# Patient Record
Sex: FEMALE | Race: WHITE | HISPANIC OR LATINO | URBAN - METROPOLITAN AREA
[De-identification: names, ages, dates, MRNs, and addresses within clinical notes are randomized per-mention and may not be internally consistent; named-entity substitution may affect disease eponyms.]

---

## 2023-02-26 LAB — URINE CULTURE: NORMAL

## 2023-03-22 ENCOUNTER — TELEPHONE (OUTPATIENT)
Dept: PRIMARY CARE | Facility: CLINIC | Age: 63
End: 2023-03-22
Payer: COMMERCIAL

## 2023-03-22 DIAGNOSIS — H91.92 HEARING LOSS OF LEFT EAR, UNSPECIFIED HEARING LOSS TYPE: Primary | ICD-10-CM

## 2023-03-22 LAB
ALANINE AMINOTRANSFERASE (SGPT) (U/L) IN SER/PLAS: 20 U/L (ref 7–45)
ALBUMIN (G/DL) IN SER/PLAS: 3.9 G/DL (ref 3.4–5)
ALKALINE PHOSPHATASE (U/L) IN SER/PLAS: 140 U/L (ref 33–136)
ANION GAP IN SER/PLAS: 12 MMOL/L (ref 10–20)
ASPARTATE AMINOTRANSFERASE (SGOT) (U/L) IN SER/PLAS: 18 U/L (ref 9–39)
BASOPHILS (10*3/UL) IN BLOOD BY AUTOMATED COUNT: 0.03 X10E9/L (ref 0–0.1)
BASOPHILS/100 LEUKOCYTES IN BLOOD BY AUTOMATED COUNT: 0.4 % (ref 0–2)
BILIRUBIN TOTAL (MG/DL) IN SER/PLAS: 0.5 MG/DL (ref 0–1.2)
C REACTIVE PROTEIN (MG/L) IN SER/PLAS: 0.14 MG/DL
CALCIUM (MG/DL) IN SER/PLAS: 8.9 MG/DL (ref 8.6–10.3)
CARBON DIOXIDE, TOTAL (MMOL/L) IN SER/PLAS: 28 MMOL/L (ref 21–32)
CHLORIDE (MMOL/L) IN SER/PLAS: 101 MMOL/L (ref 98–107)
CREATININE (MG/DL) IN SER/PLAS: 0.52 MG/DL (ref 0.5–1.05)
EOSINOPHILS (10*3/UL) IN BLOOD BY AUTOMATED COUNT: 0.05 X10E9/L (ref 0–0.7)
EOSINOPHILS/100 LEUKOCYTES IN BLOOD BY AUTOMATED COUNT: 0.7 % (ref 0–6)
ERYTHROCYTE DISTRIBUTION WIDTH (RATIO) BY AUTOMATED COUNT: 11.9 % (ref 11.5–14.5)
ERYTHROCYTE MEAN CORPUSCULAR HEMOGLOBIN CONCENTRATION (G/DL) BY AUTOMATED: 34.2 G/DL (ref 32–36)
ERYTHROCYTE MEAN CORPUSCULAR VOLUME (FL) BY AUTOMATED COUNT: 92 FL (ref 80–100)
ERYTHROCYTES (10*6/UL) IN BLOOD BY AUTOMATED COUNT: 4.45 X10E12/L (ref 4–5.2)
GFR FEMALE: >90 ML/MIN/1.73M2
GLUCOSE (MG/DL) IN SER/PLAS: 318 MG/DL (ref 74–99)
HEMATOCRIT (%) IN BLOOD BY AUTOMATED COUNT: 40.9 % (ref 36–46)
HEMOGLOBIN (G/DL) IN BLOOD: 14 G/DL (ref 12–16)
IMMATURE GRANULOCYTES/100 LEUKOCYTES IN BLOOD BY AUTOMATED COUNT: 0.3 % (ref 0–0.9)
LEUKOCYTES (10*3/UL) IN BLOOD BY AUTOMATED COUNT: 7 X10E9/L (ref 4.4–11.3)
LYMPHOCYTES (10*3/UL) IN BLOOD BY AUTOMATED COUNT: 2.21 X10E9/L (ref 1.2–4.8)
LYMPHOCYTES/100 LEUKOCYTES IN BLOOD BY AUTOMATED COUNT: 31.8 % (ref 13–44)
MONOCYTES (10*3/UL) IN BLOOD BY AUTOMATED COUNT: 0.35 X10E9/L (ref 0.1–1)
MONOCYTES/100 LEUKOCYTES IN BLOOD BY AUTOMATED COUNT: 5 % (ref 2–10)
NEUTROPHILS (10*3/UL) IN BLOOD BY AUTOMATED COUNT: 4.29 X10E9/L (ref 1.2–7.7)
NEUTROPHILS/100 LEUKOCYTES IN BLOOD BY AUTOMATED COUNT: 61.8 % (ref 40–80)
PLATELETS (10*3/UL) IN BLOOD AUTOMATED COUNT: 241 X10E9/L (ref 150–450)
POTASSIUM (MMOL/L) IN SER/PLAS: 3.6 MMOL/L (ref 3.5–5.3)
PROTEIN TOTAL: 6.4 G/DL (ref 6.4–8.2)
SEDIMENTATION RATE, ERYTHROCYTE: 13 MM/H (ref 0–30)
SODIUM (MMOL/L) IN SER/PLAS: 137 MMOL/L (ref 136–145)
UREA NITROGEN (MG/DL) IN SER/PLAS: 15 MG/DL (ref 6–23)

## 2023-03-22 NOTE — TELEPHONE ENCOUNTER
Pt called looking for a referral for an ENT she said she is having a great deal of difficulty hearing on her left side.  Please advise.

## 2023-03-23 LAB
ANTI-NUCLEAR ANTIBODY (ANA): NEGATIVE
COMPLEMENT C3 (MG/DL) IN SER/PLAS: 145 MG/DL (ref 87–200)
COMPLEMENT C4 (MG/DL) IN SER/PLAS: 28 MG/DL (ref 10–50)
RHEUMATOID FACTOR (IU/ML) IN SERUM OR PLASMA: <10 IU/ML (ref 0–15)

## 2023-03-25 LAB — CITRULLINE ANTIBODY, IGG: 2 UNITS (ref 0–19)

## 2023-03-30 ENCOUNTER — TELEPHONE (OUTPATIENT)
Dept: PRIMARY CARE | Facility: CLINIC | Age: 63
End: 2023-03-30
Payer: COMMERCIAL

## 2023-03-30 DIAGNOSIS — N30.00 ACUTE CYSTITIS WITHOUT HEMATURIA: Primary | ICD-10-CM

## 2023-03-30 RX ORDER — CIPROFLOXACIN 500 MG/1
500 TABLET ORAL 2 TIMES DAILY
Qty: 10 TABLET | Refills: 0 | Status: SHIPPED | OUTPATIENT
Start: 2023-03-30 | End: 2023-04-04

## 2023-03-30 NOTE — TELEPHONE ENCOUNTER
Per patient, she has been having UTI like symptoms since Monday, odor, cloudy urine, burning, pressure, frequency. She is asking for an appt. or antibiotics, please advise.   Local pharmacy [facility]

## 2023-04-26 ENCOUNTER — OFFICE VISIT (OUTPATIENT)
Dept: PRIMARY CARE | Facility: CLINIC | Age: 63
End: 2023-04-26
Payer: COMMERCIAL

## 2023-04-26 VITALS
OXYGEN SATURATION: 98 % | DIASTOLIC BLOOD PRESSURE: 80 MMHG | BODY MASS INDEX: 28.63 KG/M2 | RESPIRATION RATE: 16 BRPM | WEIGHT: 142 LBS | HEART RATE: 68 BPM | HEIGHT: 59 IN | TEMPERATURE: 97.8 F | SYSTOLIC BLOOD PRESSURE: 128 MMHG

## 2023-04-26 DIAGNOSIS — Z12.31 ENCOUNTER FOR SCREENING MAMMOGRAM FOR BREAST CANCER: ICD-10-CM

## 2023-04-26 DIAGNOSIS — R53.81 PHYSICAL DEBILITY: ICD-10-CM

## 2023-04-26 DIAGNOSIS — Z00.00 HEALTHCARE MAINTENANCE: Primary | ICD-10-CM

## 2023-04-26 DIAGNOSIS — Z12.31 ENCOUNTER FOR SCREENING MAMMOGRAM FOR MALIGNANT NEOPLASM OF BREAST: ICD-10-CM

## 2023-04-26 LAB
POC APPEARANCE, URINE: CLEAR
POC BILIRUBIN, URINE: NEGATIVE
POC BLOOD, URINE: NEGATIVE
POC COLOR, URINE: YELLOW
POC GLUCOSE, URINE: ABNORMAL MG/DL
POC KETONES, URINE: NEGATIVE MG/DL
POC LEUKOCYTES, URINE: NEGATIVE
POC NITRITE,URINE: NEGATIVE
POC PH, URINE: 6 PH
POC PROTEIN, URINE: NEGATIVE MG/DL
POC SPECIFIC GRAVITY, URINE: 1.01
POC UROBILINOGEN, URINE: 0.2 EU/DL

## 2023-04-26 PROCEDURE — 81002 URINALYSIS NONAUTO W/O SCOPE: CPT | Performed by: FAMILY MEDICINE

## 2023-04-26 PROCEDURE — 99396 PREV VISIT EST AGE 40-64: CPT | Performed by: FAMILY MEDICINE

## 2023-04-26 PROCEDURE — 93000 ELECTROCARDIOGRAM COMPLETE: CPT | Performed by: FAMILY MEDICINE

## 2023-04-26 PROCEDURE — 1036F TOBACCO NON-USER: CPT | Performed by: FAMILY MEDICINE

## 2023-04-26 RX ORDER — METFORMIN HYDROCHLORIDE 1000 MG/1
1 TABLET ORAL 2 TIMES DAILY
COMMUNITY
Start: 2020-07-22 | End: 2024-01-17 | Stop reason: SDUPTHER

## 2023-04-26 RX ORDER — LISINOPRIL 10 MG/1
1 TABLET ORAL DAILY
COMMUNITY
Start: 2019-12-02 | End: 2024-06-04 | Stop reason: SDUPTHER

## 2023-04-26 RX ORDER — MINOCYCLINE HYDROCHLORIDE 100 MG/1
100 CAPSULE ORAL DAILY
COMMUNITY
End: 2024-06-04 | Stop reason: WASHOUT

## 2023-04-26 RX ORDER — INSULIN DEGLUDEC 100 U/ML
INJECTION, SOLUTION SUBCUTANEOUS
COMMUNITY
Start: 2020-03-13 | End: 2024-01-17 | Stop reason: SDUPTHER

## 2023-04-26 RX ORDER — FLASH GLUCOSE SENSOR
KIT MISCELLANEOUS
COMMUNITY
Start: 2023-02-25 | End: 2024-01-17 | Stop reason: SDUPTHER

## 2023-04-26 RX ORDER — ERGOCALCIFEROL 1.25 MG/1
CAPSULE ORAL
COMMUNITY
Start: 2022-12-16 | End: 2023-08-28 | Stop reason: ALTCHOICE

## 2023-04-26 RX ORDER — SEMAGLUTIDE 1.34 MG/ML
2 INJECTION, SOLUTION SUBCUTANEOUS
COMMUNITY
End: 2024-01-17 | Stop reason: WASHOUT

## 2023-04-26 ASSESSMENT — ENCOUNTER SYMPTOMS
GASTROINTESTINAL NEGATIVE: 1
NEUROLOGICAL NEGATIVE: 1
OCCASIONAL FEELINGS OF UNSTEADINESS: 0
PSYCHIATRIC NEGATIVE: 1
DEPRESSION: 0
LOSS OF SENSATION IN FEET: 0
RESPIRATORY NEGATIVE: 1
CONSTITUTIONAL NEGATIVE: 1
EYES NEGATIVE: 1
ENDOCRINE NEGATIVE: 1
CARDIOVASCULAR NEGATIVE: 1

## 2023-04-26 ASSESSMENT — PATIENT HEALTH QUESTIONNAIRE - PHQ9
2. FEELING DOWN, DEPRESSED OR HOPELESS: NOT AT ALL
SUM OF ALL RESPONSES TO PHQ9 QUESTIONS 1 AND 2: 0
1. LITTLE INTEREST OR PLEASURE IN DOING THINGS: NOT AT ALL

## 2023-04-26 NOTE — PROGRESS NOTES
"Subjective   Patient ID: Stacie Richmond is a 62 y.o. female who presents for Annual Exam.        Review of Systems   Constitutional: Negative.    HENT: Negative.     Eyes: Negative.    Respiratory: Negative.     Cardiovascular: Negative.    Gastrointestinal: Negative.    Endocrine: Negative.    Genitourinary: Negative.    Skin: Negative.    Neurological: Negative.    Psychiatric/Behavioral: Negative.         Objective   /80 (BP Location: Left arm, Patient Position: Sitting)   Pulse 68   Temp 36.6 °C (97.8 °F)   Resp 16   Ht 1.499 m (4' 11\")   Wt 64.4 kg (142 lb)   SpO2 98%   BMI 28.68 kg/m²     Physical Exam  Constitutional:       General: She is not in acute distress.     Appearance: Normal appearance.   HENT:      Head: Normocephalic and atraumatic.      Right Ear: Tympanic membrane normal.      Left Ear: Tympanic membrane normal.      Nose: Nose normal.      Mouth/Throat:      Mouth: Mucous membranes are dry.      Pharynx: Oropharynx is clear.   Eyes:      Conjunctiva/sclera: Conjunctivae normal.      Pupils: Pupils are equal, round, and reactive to light.   Neck:      Vascular: No carotid bruit.   Cardiovascular:      Rate and Rhythm: Normal rate and regular rhythm.      Heart sounds: Normal heart sounds.   Pulmonary:      Effort: Pulmonary effort is normal.      Breath sounds: Normal breath sounds.   Abdominal:      General: Bowel sounds are normal.      Palpations: Abdomen is soft.   Musculoskeletal:      Cervical back: Neck supple. No tenderness.   Skin:     General: Skin is warm and dry.   Neurological:      General: No focal deficit present.      Mental Status: She is alert.   Psychiatric:         Mood and Affect: Mood normal.         Behavior: Behavior normal.         Assessment/Plan   Problem List Items Addressed This Visit    None  Visit Diagnoses       Healthcare maintenance    -  Primary    Relevant Orders    ECG 12 lead (Clinic Performed) (Completed)    POCT UA (nonautomated) manually " resulted (Completed)    CBC    Comprehensive Metabolic Panel    Lipid Panel    Encounter for screening mammogram for malignant neoplasm of breast        Relevant Orders    BI mammo bilateral screening tomosynthesis    Encounter for screening mammogram for breast cancer        Relevant Orders    BI mammo bilateral screening tomosynthesis    Physical debility        Relevant Orders    Disability Placard

## 2023-08-28 ENCOUNTER — OFFICE VISIT (OUTPATIENT)
Dept: PRIMARY CARE | Facility: CLINIC | Age: 63
End: 2023-08-28
Payer: COMMERCIAL

## 2023-08-28 ENCOUNTER — LAB (OUTPATIENT)
Dept: LAB | Facility: LAB | Age: 63
End: 2023-08-28
Payer: COMMERCIAL

## 2023-08-28 ENCOUNTER — TELEPHONE (OUTPATIENT)
Dept: PRIMARY CARE | Facility: CLINIC | Age: 63
End: 2023-08-28

## 2023-08-28 VITALS
OXYGEN SATURATION: 97 % | DIASTOLIC BLOOD PRESSURE: 84 MMHG | TEMPERATURE: 98.1 F | BODY MASS INDEX: 28.88 KG/M2 | HEART RATE: 74 BPM | SYSTOLIC BLOOD PRESSURE: 132 MMHG | WEIGHT: 143 LBS | RESPIRATION RATE: 18 BRPM

## 2023-08-28 DIAGNOSIS — M79.675 TOE PAIN, LEFT: Primary | ICD-10-CM

## 2023-08-28 DIAGNOSIS — M79.675 TOE PAIN, LEFT: ICD-10-CM

## 2023-08-28 DIAGNOSIS — Z00.00 HEALTHCARE MAINTENANCE: ICD-10-CM

## 2023-08-28 LAB
ERYTHROCYTE DISTRIBUTION WIDTH (RATIO) BY AUTOMATED COUNT: 12.5 % (ref 11.5–14.5)
ERYTHROCYTE MEAN CORPUSCULAR HEMOGLOBIN CONCENTRATION (G/DL) BY AUTOMATED: 34.3 G/DL (ref 32–36)
ERYTHROCYTE MEAN CORPUSCULAR VOLUME (FL) BY AUTOMATED COUNT: 91 FL (ref 80–100)
ERYTHROCYTES (10*6/UL) IN BLOOD BY AUTOMATED COUNT: 4.7 X10E12/L (ref 4–5.2)
HEMATOCRIT (%) IN BLOOD BY AUTOMATED COUNT: 42.6 % (ref 36–46)
HEMOGLOBIN (G/DL) IN BLOOD: 14.6 G/DL (ref 12–16)
LEUKOCYTES (10*3/UL) IN BLOOD BY AUTOMATED COUNT: 7.6 X10E9/L (ref 4.4–11.3)
PLATELETS (10*3/UL) IN BLOOD AUTOMATED COUNT: 235 X10E9/L (ref 150–450)

## 2023-08-28 PROCEDURE — 80053 COMPREHEN METABOLIC PANEL: CPT

## 2023-08-28 PROCEDURE — 1036F TOBACCO NON-USER: CPT | Performed by: NURSE PRACTITIONER

## 2023-08-28 PROCEDURE — 36415 COLL VENOUS BLD VENIPUNCTURE: CPT

## 2023-08-28 PROCEDURE — 80061 LIPID PANEL: CPT

## 2023-08-28 PROCEDURE — 99214 OFFICE O/P EST MOD 30 MIN: CPT | Performed by: NURSE PRACTITIONER

## 2023-08-28 PROCEDURE — 85027 COMPLETE CBC AUTOMATED: CPT

## 2023-08-28 RX ORDER — DOXYCYCLINE 100 MG/1
100 CAPSULE ORAL 2 TIMES DAILY
Qty: 20 CAPSULE | Refills: 0 | Status: SHIPPED | OUTPATIENT
Start: 2023-08-28 | End: 2023-09-07

## 2023-08-28 RX ORDER — MUPIROCIN 20 MG/G
OINTMENT TOPICAL 3 TIMES DAILY
Qty: 22 G | Refills: 0 | Status: SHIPPED | OUTPATIENT
Start: 2023-08-28 | End: 2023-09-07

## 2023-08-28 ASSESSMENT — ENCOUNTER SYMPTOMS
GASTROINTESTINAL NEGATIVE: 1
APPETITE CHANGE: 0
RESPIRATORY NEGATIVE: 1
CARDIOVASCULAR NEGATIVE: 1
WOUND: 1
CHILLS: 0
MUSCULOSKELETAL NEGATIVE: 1
FEVER: 0
CONSTITUTIONAL NEGATIVE: 1

## 2023-08-28 NOTE — PROGRESS NOTES
Subjective   Patient ID: Stacie Richmond is a 62 y.o. female who presents for Toe Pain.    Patient with left fourth digit drainage and pain. No known injury to the area. Today, patient noticed some green drainage. Pt reports that it hurts to put pressure on it. No prior skin infection or staph infection.     Review of Systems   Constitutional: Negative.  Negative for appetite change, chills and fever.   HENT: Negative.     Respiratory: Negative.     Cardiovascular: Negative.    Gastrointestinal: Negative.    Musculoskeletal: Negative.    Skin:  Positive for wound.       Objective   There were no vitals taken for this visit.    Physical Exam  Vitals reviewed.   Constitutional:       General: She is not in acute distress.     Appearance: Normal appearance. She is not ill-appearing.   HENT:      Head: Atraumatic.   Cardiovascular:      Rate and Rhythm: Normal rate and regular rhythm.      Heart sounds: Normal heart sounds. No murmur heard.  Pulmonary:      Effort: Pulmonary effort is normal.      Breath sounds: Normal breath sounds. No wheezing or rhonchi.   Musculoskeletal:         General: Normal range of motion.   Skin:     General: Skin is warm and dry.      Findings: No rash.      Comments: Patient's left fourth toe is swollen and tender to touch. There is no discharge or drainage.    Neurological:      General: No focal deficit present.      Mental Status: She is alert and oriented to person, place, and time.   Psychiatric:         Mood and Affect: Mood normal.         Behavior: Behavior normal.     Assessment/Plan   Problem List Items Addressed This Visit    None  Visit Diagnoses       Toe pain, left    -  Primary    Relevant Medications    mupirocin (Bactroban) 2 % ointment    doxycycline (Vibramycin) 100 mg capsule    Other Relevant Orders    CBC    Comprehensive Metabolic Panel    XR toe left 2+ views        Will start pt on doxycycline and bactroban to cover for infection. Will get xray of the toe and blood  work as well to check CBC. Advised pt to wash foot with Gold Dial soap. Advised pt to elevate the foot. Discussed that she is to go to the ER for any worsening toe swelling, pain or new/concerning symptoms; she agreed. Pt to follow up in 2-3 days if no better despite the use of the medications. Will call pt when labs become available.

## 2023-08-28 NOTE — TELEPHONE ENCOUNTER
Spoke to patient and relayed results of toe xray that showed soft tissue swelling. Patient to continue with plan of care discussed today and follow up with PCP next week; she agreed.

## 2023-08-29 ENCOUNTER — TELEPHONE (OUTPATIENT)
Dept: PRIMARY CARE | Facility: CLINIC | Age: 63
End: 2023-08-29
Payer: COMMERCIAL

## 2023-08-29 LAB
ALANINE AMINOTRANSFERASE (SGPT) (U/L) IN SER/PLAS: 24 U/L (ref 7–45)
ALBUMIN (G/DL) IN SER/PLAS: 4 G/DL (ref 3.4–5)
ALKALINE PHOSPHATASE (U/L) IN SER/PLAS: 168 U/L (ref 33–136)
ANION GAP IN SER/PLAS: 12 MMOL/L (ref 10–20)
ASPARTATE AMINOTRANSFERASE (SGOT) (U/L) IN SER/PLAS: 38 U/L (ref 9–39)
BILIRUBIN TOTAL (MG/DL) IN SER/PLAS: 0.4 MG/DL (ref 0–1.2)
CALCIUM (MG/DL) IN SER/PLAS: 9 MG/DL (ref 8.6–10.3)
CARBON DIOXIDE, TOTAL (MMOL/L) IN SER/PLAS: 30 MMOL/L (ref 21–32)
CHLORIDE (MMOL/L) IN SER/PLAS: 101 MMOL/L (ref 98–107)
CHOLESTEROL (MG/DL) IN SER/PLAS: 144 MG/DL (ref 0–199)
CHOLESTEROL IN HDL (MG/DL) IN SER/PLAS: 51.5 MG/DL
CHOLESTEROL/HDL RATIO: 2.8
CREATININE (MG/DL) IN SER/PLAS: 0.63 MG/DL (ref 0.5–1.05)
GFR FEMALE: >90 ML/MIN/1.73M2
GLUCOSE (MG/DL) IN SER/PLAS: 249 MG/DL (ref 74–99)
LDL: 41 MG/DL (ref 0–99)
NON HDL CHOLESTEROL: 93 MG/DL
POTASSIUM (MMOL/L) IN SER/PLAS: 3.7 MMOL/L (ref 3.5–5.3)
PROTEIN TOTAL: 6.5 G/DL (ref 6.4–8.2)
SODIUM (MMOL/L) IN SER/PLAS: 139 MMOL/L (ref 136–145)
TRIGLYCERIDE (MG/DL) IN SER/PLAS: 259 MG/DL (ref 0–149)
UREA NITROGEN (MG/DL) IN SER/PLAS: 12 MG/DL (ref 6–23)
VLDL: 52 MG/DL (ref 0–40)

## 2024-01-04 ENCOUNTER — APPOINTMENT (OUTPATIENT)
Dept: OPHTHALMOLOGY | Facility: CLINIC | Age: 64
End: 2024-01-04
Payer: COMMERCIAL

## 2024-01-17 ENCOUNTER — OFFICE VISIT (OUTPATIENT)
Dept: ENDOCRINOLOGY | Facility: CLINIC | Age: 64
End: 2024-01-17
Payer: COMMERCIAL

## 2024-01-17 ENCOUNTER — LAB (OUTPATIENT)
Dept: LAB | Facility: LAB | Age: 64
End: 2024-01-17
Payer: COMMERCIAL

## 2024-01-17 VITALS
WEIGHT: 160 LBS | BODY MASS INDEX: 32.25 KG/M2 | DIASTOLIC BLOOD PRESSURE: 83 MMHG | HEART RATE: 81 BPM | SYSTOLIC BLOOD PRESSURE: 137 MMHG | HEIGHT: 59 IN

## 2024-01-17 DIAGNOSIS — Z79.4 TYPE 2 DIABETES MELLITUS WITH HYPERGLYCEMIA, WITH LONG-TERM CURRENT USE OF INSULIN (MULTI): ICD-10-CM

## 2024-01-17 DIAGNOSIS — E11.65 TYPE 2 DIABETES MELLITUS WITH HYPERGLYCEMIA, WITH LONG-TERM CURRENT USE OF INSULIN (MULTI): ICD-10-CM

## 2024-01-17 DIAGNOSIS — Z79.4 TYPE 2 DIABETES MELLITUS WITH HYPERGLYCEMIA, WITH LONG-TERM CURRENT USE OF INSULIN (MULTI): Primary | ICD-10-CM

## 2024-01-17 DIAGNOSIS — E11.65 TYPE 2 DIABETES MELLITUS WITH HYPERGLYCEMIA, WITH LONG-TERM CURRENT USE OF INSULIN (MULTI): Primary | ICD-10-CM

## 2024-01-17 LAB
ALBUMIN SERPL BCP-MCNC: 3.9 G/DL (ref 3.4–5)
ALP SERPL-CCNC: 140 U/L (ref 33–136)
ALT SERPL W P-5'-P-CCNC: 21 U/L (ref 7–45)
ANION GAP SERPL CALC-SCNC: 14 MMOL/L (ref 10–20)
AST SERPL W P-5'-P-CCNC: 18 U/L (ref 9–39)
BILIRUB SERPL-MCNC: 0.4 MG/DL (ref 0–1.2)
BUN SERPL-MCNC: 17 MG/DL (ref 6–23)
CALCIUM SERPL-MCNC: 9.2 MG/DL (ref 8.6–10.6)
CHLORIDE SERPL-SCNC: 102 MMOL/L (ref 98–107)
CHOLEST SERPL-MCNC: 142 MG/DL (ref 0–199)
CHOLESTEROL/HDL RATIO: 2.5
CO2 SERPL-SCNC: 28 MMOL/L (ref 21–32)
CREAT SERPL-MCNC: 0.48 MG/DL (ref 0.5–1.05)
CREAT UR-MCNC: 171 MG/DL (ref 20–320)
EGFRCR SERPLBLD CKD-EPI 2021: >90 ML/MIN/1.73M*2
EST. AVERAGE GLUCOSE BLD GHB EST-MCNC: 355 MG/DL
GLUCOSE SERPL-MCNC: 106 MG/DL (ref 74–99)
HBA1C MFR BLD: 14 %
HDLC SERPL-MCNC: 56.4 MG/DL
LDLC SERPL CALC-MCNC: 45 MG/DL
MICROALBUMIN UR-MCNC: 138.3 MG/L
MICROALBUMIN/CREAT UR: 80.9 UG/MG CREAT
NON HDL CHOLESTEROL: 86 MG/DL (ref 0–149)
POTASSIUM SERPL-SCNC: 4 MMOL/L (ref 3.5–5.3)
PROT SERPL-MCNC: 6.1 G/DL (ref 6.4–8.2)
SODIUM SERPL-SCNC: 140 MMOL/L (ref 136–145)
TRIGL SERPL-MCNC: 203 MG/DL (ref 0–149)
VLDL: 41 MG/DL (ref 0–40)

## 2024-01-17 PROCEDURE — 3075F SYST BP GE 130 - 139MM HG: CPT | Performed by: PHYSICIAN ASSISTANT

## 2024-01-17 PROCEDURE — 83036 HEMOGLOBIN GLYCOSYLATED A1C: CPT

## 2024-01-17 PROCEDURE — 82570 ASSAY OF URINE CREATININE: CPT

## 2024-01-17 PROCEDURE — 1036F TOBACCO NON-USER: CPT | Performed by: PHYSICIAN ASSISTANT

## 2024-01-17 PROCEDURE — 99214 OFFICE O/P EST MOD 30 MIN: CPT | Performed by: PHYSICIAN ASSISTANT

## 2024-01-17 PROCEDURE — 4010F ACE/ARB THERAPY RXD/TAKEN: CPT | Performed by: PHYSICIAN ASSISTANT

## 2024-01-17 PROCEDURE — 82043 UR ALBUMIN QUANTITATIVE: CPT

## 2024-01-17 PROCEDURE — 36415 COLL VENOUS BLD VENIPUNCTURE: CPT

## 2024-01-17 PROCEDURE — 80053 COMPREHEN METABOLIC PANEL: CPT

## 2024-01-17 PROCEDURE — 80061 LIPID PANEL: CPT

## 2024-01-17 PROCEDURE — 3079F DIAST BP 80-89 MM HG: CPT | Performed by: PHYSICIAN ASSISTANT

## 2024-01-17 RX ORDER — METFORMIN HYDROCHLORIDE 1000 MG/1
1000 TABLET ORAL 2 TIMES DAILY
Qty: 180 TABLET | Refills: 3 | Status: SHIPPED | OUTPATIENT
Start: 2024-01-17 | End: 2024-06-04 | Stop reason: SDUPTHER

## 2024-01-17 RX ORDER — SEMAGLUTIDE 2.68 MG/ML
2 INJECTION, SOLUTION SUBCUTANEOUS
Qty: 9 ML | Refills: 3 | Status: SHIPPED | OUTPATIENT
Start: 2024-01-17 | End: 2024-06-04 | Stop reason: SDUPTHER

## 2024-01-17 RX ORDER — LANCETS
1 EACH MISCELLANEOUS 4 TIMES DAILY
Qty: 400 EACH | Refills: 3 | Status: SHIPPED | OUTPATIENT
Start: 2024-01-17 | End: 2024-06-04 | Stop reason: SDUPTHER

## 2024-01-17 RX ORDER — FLASH GLUCOSE SENSOR
KIT MISCELLANEOUS
Qty: 6 EACH | Refills: 3 | Status: SHIPPED | OUTPATIENT
Start: 2024-01-17 | End: 2024-06-04 | Stop reason: SDUPTHER

## 2024-01-17 RX ORDER — PEN NEEDLE, DIABETIC 30 GX3/16"
1 NEEDLE, DISPOSABLE MISCELLANEOUS 4 TIMES DAILY
Qty: 400 EACH | Refills: 3 | Status: SHIPPED | OUTPATIENT
Start: 2024-01-17 | End: 2024-06-04 | Stop reason: SDUPTHER

## 2024-01-17 RX ORDER — IBUPROFEN 200 MG
1 CAPSULE ORAL 4 TIMES DAILY
Qty: 400 STRIP | Refills: 3 | Status: SHIPPED | OUTPATIENT
Start: 2024-01-17 | End: 2024-06-04 | Stop reason: SDUPTHER

## 2024-01-17 RX ORDER — INSULIN ASPART INJECTION 100 [IU]/ML
8 INJECTION, SOLUTION SUBCUTANEOUS
Qty: 21.6 ML | Refills: 3 | Status: SHIPPED | OUTPATIENT
Start: 2024-01-17 | End: 2025-01-16

## 2024-01-17 RX ORDER — DEXTROSE 4 G
1 TABLET,CHEWABLE ORAL 4 TIMES DAILY
Qty: 1 EACH | Refills: 0 | Status: SHIPPED | OUTPATIENT
Start: 2024-01-17 | End: 2024-04-16

## 2024-01-17 RX ORDER — ISOPROPYL ALCOHOL 70 ML/100ML
1 SWAB TOPICAL 4 TIMES DAILY
Qty: 400 EACH | Refills: 3 | Status: SHIPPED | OUTPATIENT
Start: 2024-01-17 | End: 2025-01-16

## 2024-01-17 RX ORDER — INSULIN DEGLUDEC 100 U/ML
25 INJECTION, SOLUTION SUBCUTANEOUS EVERY MORNING
Qty: 22.5 ML | Refills: 3 | Status: SHIPPED | OUTPATIENT
Start: 2024-01-17 | End: 2025-01-16

## 2024-01-17 ASSESSMENT — PAIN SCALES - GENERAL: PAINLEVEL: 2

## 2024-01-17 NOTE — PATIENT INSTRUCTIONS
"Type 2 diabetes mellitus with hyperglycemia, with long-term current use of insulin (CMS/[facility])  -     semaglutide (Ozempic) 2 mg/dose (8 mg/3 mL) pen injector; Inject 2 mg under the skin 1 (one) time per week.  -     metFORMIN (Glucophage) 1,000 mg tablet; Take 1 tablet (1,000 mg) by mouth 2 times a day.  -     FreeStyle Tina 2 Sensor kit; CHANGE SENSOR EVERY 14 DAYS AS DIRECTED.  -     Tresiba FlexTouch U-100 100 unit/mL (3 mL) injection; Inject 25 Units under the skin once daily in the morning.  -     pen needle, diabetic 32 gauge x 5/32\" needle; 1 each 4 times a day. Use to inject insulin 4 times daily  -     insulin aspart, with niacinamide, (Fiasp FlexTouch U-100 Insulin) 100 unit/mL (3 mL) injection; Inject 8 Units under the skin 3 times a day with meals.  -     blood-glucose meter misc; 1 each 4 times a day. Use to check glucose 4 times daily, before meals and at bedtime  -     blood sugar diagnostic (Blood Glucose Test) strip; 1 each 4 times a day. Use to check glucose 4 times daily, before meals and at bedtime  -     lancets misc; 1 each 4 times a day. Use to check glucose 4 times daily, before meals and at bedtime  -     alcohol swabs pads, medicated; Apply 1 each topically 4 times a day. Use prior to checking glucose or injecting insulin      Type 2 diabetes mellitus, is not at goal. A1C due for update    RX changes: none   Education:  interpretation of lab results, blood sugar goals, and self-monitoring of blood glucose skills  Follow up: I recommend diabetes care be 6 months.    Drink extra water     Try eat foods in order of 1 - green vegetables, 2 - protein, 3 - starch/carbs     CONTINUE NOVOLOG as 8 units injected 15 minutes before your meals      CONTINUE TRESIBA to 25 units once daily      CONTINUE METFORMIN 1000mg as 1 tablet twice a day with meals     CONTINUE OZEMPIC to 2mg once a week, you may start Tums for heartburn or nausea you may take sugar free metamucil for constipation if you experience " these side effects     Please continue to check your glucose 4 times a day (before each meal and at bedtime).  - use Freestyle shayy on your cell phone mike to monitor your sugar

## 2024-01-17 NOTE — PROGRESS NOTES
"Subjective   Stacie Richmond is a 63 y.o. female who presents for follow-up of Type 2 diabetes mellitus. The initial diagnosis of diabetes was made in 2002 . The patient does have a known family history of diabetes.    Known complications due to diabetes included obesity    Cardiovascular risk factors include diabetes mellitus, hypertension, and obesity (BMI >= 30 kg/m2). The patient is on an ACE inhibitor or angiotensin II receptor blocker.  The patient has not been previously hospitalized due to diabetic ketoacidosis.     Current symptoms/problems include hyperglycemia. Her  diabetes treatment has not been disrupted, though she is overdue for A1C assessment .     Current diabetes regimen is as follows:  ozempic 2mg, metformin 1000mg twice daily, tresiba 25u at bedtime, novolog 8u with meals.       The patient is not currently checking the blood glucose. She intends to resume cgm wear    Patient is using: both glucometer and continuous glucose monitor    Hypoglycemia frequency: none  Hypoglycemia awareness: Yes     Exercise: rarely   Meal panning: She is using avoidance of concentrated sweets.    Review of Systems   All other systems reviewed and are negative.      Objective   /83   Pulse 81   Ht 1.499 m (4' 11\")   Wt 72.6 kg (160 lb)   BMI 32.32 kg/m²   Physical Exam  Constitutional:       Appearance: Normal appearance.      Comments: Overweight, otherwise appears well   HENT:      Head: Normocephalic and atraumatic.      Nose: Nose normal.      Mouth/Throat:      Mouth: Mucous membranes are dry.      Pharynx: Oropharynx is clear.   Eyes:      Extraocular Movements: Extraocular movements intact.      Conjunctiva/sclera: Conjunctivae normal.   Cardiovascular:      Rate and Rhythm: Normal rate and regular rhythm.      Pulses: Normal pulses.      Heart sounds: Normal heart sounds.   Pulmonary:      Effort: Pulmonary effort is normal.      Breath sounds: Normal breath sounds.   Abdominal:      General: " "Abdomen is flat. Bowel sounds are normal.      Palpations: Abdomen is soft.   Skin:     General: Skin is warm and dry.   Neurological:      General: No focal deficit present.      Mental Status: She is alert and oriented to person, place, and time. Mental status is at baseline.   Psychiatric:         Mood and Affect: Mood normal.         Behavior: Behavior normal.         Thought Content: Thought content normal.         Judgment: Judgment normal.         Lab Review  Glucose (mg/dL)   Date Value   08/28/2023 249 (H)   03/22/2023 318 (H)   12/12/2022 242 (H)     Hemoglobin A1C (%)   Date Value   04/26/2022 12.8 (A)     Bicarbonate (mmol/L)   Date Value   08/28/2023 30   03/22/2023 28   12/12/2022 28     Urea Nitrogen (mg/dL)   Date Value   08/28/2023 12   03/22/2023 15   12/12/2022 17     Creatinine (mg/dL)   Date Value   08/28/2023 0.63   03/22/2023 0.52   12/12/2022 0.52       Health Maintenance:   Foot Exam: due for update  Eye Exam: due for update  Lipid Panel: due for update, ordered today  Urine Albumin: due for update, ordered today    Assessment/Plan   Diagnoses and all orders for this visit:  Type 2 diabetes mellitus with hyperglycemia, with long-term current use of insulin (CMS/[facility])  -     semaglutide (Ozempic) 2 mg/dose (8 mg/3 mL) pen injector; Inject 2 mg under the skin 1 (one) time per week.  -     metFORMIN (Glucophage) 1,000 mg tablet; Take 1 tablet (1,000 mg) by mouth 2 times a day.  -     FreeStyle Tina 2 Sensor kit; CHANGE SENSOR EVERY 14 DAYS AS DIRECTED.  -     Tresiba FlexTouch U-100 100 unit/mL (3 mL) injection; Inject 25 Units under the skin once daily in the morning.  -     pen needle, diabetic 32 gauge x 5/32\" needle; 1 each 4 times a day. Use to inject insulin 4 times daily  -     insulin aspart, with niacinamide, (Fiasp FlexTouch U-100 Insulin) 100 unit/mL (3 mL) injection; Inject 8 Units under the skin 3 times a day with meals.  -     blood-glucose meter misc; 1 each 4 times a day. Use to " check glucose 4 times daily, before meals and at bedtime  -     blood sugar diagnostic (Blood Glucose Test) strip; 1 each 4 times a day. Use to check glucose 4 times daily, before meals and at bedtime  -     lancets misc; 1 each 4 times a day. Use to check glucose 4 times daily, before meals and at bedtime  -     alcohol swabs pads, medicated; Apply 1 each topically 4 times a day. Use prior to checking glucose or injecting insulin  -     Hemoglobin A1C; Standing  -     Albumin , Urine Random; Future  -     Comprehensive Metabolic Panel; Future  -     Lipid Panel; Future      Type 2 diabetes mellitus, is not at goal. A1C due for update    RX changes: none   Education:  interpretation of lab results, blood sugar goals, and self-monitoring of blood glucose skills  Follow up: I recommend diabetes care be 6 months.    Drink extra water     Try eat foods in order of 1 - green vegetables, 2 - protein, 3 - starch/carbs     CONTINUE NOVOLOG as 8 units injected 15 minutes before your meals      CONTINUE TRESIBA to 25 units once daily      CONTINUE METFORMIN 1000mg as 1 tablet twice a day with meals     CONTINUE OZEMPIC to 2mg once a week, you may start Tums for heartburn or nausea you may take sugar free metamucil for constipation if you experience these side effects     Please continue to check your glucose 4 times a day (before each meal and at bedtime).  - use Freestyle shayy on your cell phone mike to monitor your sugar

## 2024-02-23 ENCOUNTER — TELEPHONE (OUTPATIENT)
Dept: ORTHOPEDIC SURGERY | Facility: HOSPITAL | Age: 64
End: 2024-02-23
Payer: COMMERCIAL

## 2024-02-26 ENCOUNTER — APPOINTMENT (OUTPATIENT)
Dept: ORTHOPEDIC SURGERY | Facility: CLINIC | Age: 64
End: 2024-02-26

## 2024-03-25 ENCOUNTER — OFFICE VISIT (OUTPATIENT)
Dept: PRIMARY CARE | Facility: CLINIC | Age: 64
End: 2024-03-25
Payer: COMMERCIAL

## 2024-03-25 VITALS
TEMPERATURE: 98.1 F | DIASTOLIC BLOOD PRESSURE: 84 MMHG | RESPIRATION RATE: 20 BRPM | HEART RATE: 90 BPM | SYSTOLIC BLOOD PRESSURE: 136 MMHG | BODY MASS INDEX: 29.49 KG/M2 | OXYGEN SATURATION: 98 % | WEIGHT: 146 LBS

## 2024-03-25 DIAGNOSIS — R09.81 NASAL CONGESTION: Primary | ICD-10-CM

## 2024-03-25 PROCEDURE — 87636 SARSCOV2 & INF A&B AMP PRB: CPT

## 2024-03-25 PROCEDURE — 99213 OFFICE O/P EST LOW 20 MIN: CPT | Performed by: NURSE PRACTITIONER

## 2024-03-25 PROCEDURE — 4010F ACE/ARB THERAPY RXD/TAKEN: CPT | Performed by: NURSE PRACTITIONER

## 2024-03-25 PROCEDURE — 3079F DIAST BP 80-89 MM HG: CPT | Performed by: NURSE PRACTITIONER

## 2024-03-25 PROCEDURE — 3046F HEMOGLOBIN A1C LEVEL >9.0%: CPT | Performed by: NURSE PRACTITIONER

## 2024-03-25 PROCEDURE — 3060F POS MICROALBUMINURIA REV: CPT | Performed by: NURSE PRACTITIONER

## 2024-03-25 PROCEDURE — 3048F LDL-C <100 MG/DL: CPT | Performed by: NURSE PRACTITIONER

## 2024-03-25 PROCEDURE — 1036F TOBACCO NON-USER: CPT | Performed by: NURSE PRACTITIONER

## 2024-03-25 PROCEDURE — 3075F SYST BP GE 130 - 139MM HG: CPT | Performed by: NURSE PRACTITIONER

## 2024-03-25 ASSESSMENT — ENCOUNTER SYMPTOMS
ACTIVITY CHANGE: 0
SLEEP DISTURBANCE: 1
FATIGUE: 1
FEVER: 0
APPETITE CHANGE: 0
CHILLS: 1
COUGH: 1
CONSTIPATION: 0
NAUSEA: 1
VOMITING: 1
SORE THROAT: 0
HEADACHES: 1
DIARRHEA: 0

## 2024-03-25 NOTE — PROGRESS NOTES
Subjective   Patient ID: Stacie Richmond is a 63 y.o. female who presents for Vomiting.    Nausea and vomiting x1 day  Chills  Headache  Fatigue/weak  Body aches  Nasal congestion  Decreased appetite/ drinking ok  No GI issues    If flu is positive; declines Tamiflu    OTC- Advil    Vomiting   This is a new problem. The current episode started yesterday. The problem occurs 2 to 4 times per day. The problem has been unchanged. Associated symptoms include chills, coughing and headaches. Pertinent negatives include no diarrhea or fever. Associated symptoms comments: nausea. She has tried nothing for the symptoms.        Review of Systems   Constitutional:  Positive for chills and fatigue. Negative for activity change, appetite change and fever.   HENT:  Positive for congestion. Negative for ear pain and sore throat.    Respiratory:  Positive for cough.    Gastrointestinal:  Positive for nausea and vomiting. Negative for constipation and diarrhea.   Neurological:  Positive for headaches.   Psychiatric/Behavioral:  Positive for sleep disturbance.        Objective   /84   Pulse 90   Temp 36.7 °C (98.1 °F) (Temporal)   Resp 20   Wt 66.2 kg (146 lb)   SpO2 98%   BMI 29.49 kg/m²     Physical Exam  Vitals reviewed.   Constitutional:       General: She is awake. She is not in acute distress.     Appearance: Normal appearance. She is well-developed and well-groomed. She is ill-appearing. She is not toxic-appearing.   HENT:      Head: Normocephalic.      Right Ear: Tympanic membrane, ear canal and external ear normal.      Left Ear: Tympanic membrane, ear canal and external ear normal.      Nose: Mucosal edema and congestion present.      Right Turbinates: Swollen.      Left Turbinates: Swollen.      Mouth/Throat:      Lips: Pink.      Mouth: Mucous membranes are moist.      Pharynx: Posterior oropharyngeal erythema present.   Eyes:      Extraocular Movements: Extraocular movements intact.      Conjunctiva/sclera:  Conjunctivae normal.      Pupils: Pupils are equal, round, and reactive to light.   Cardiovascular:      Rate and Rhythm: Normal rate and regular rhythm.      Pulses: Normal pulses.      Heart sounds: Normal heart sounds.   Pulmonary:      Effort: Pulmonary effort is normal.      Breath sounds: Normal breath sounds.   Musculoskeletal:      Cervical back: Normal range of motion and neck supple.   Skin:     General: Skin is warm.      Capillary Refill: Capillary refill takes less than 2 seconds.   Neurological:      General: No focal deficit present.      Mental Status: She is alert and oriented to person, place, and time.   Psychiatric:         Mood and Affect: Mood normal.         Behavior: Behavior normal. Behavior is cooperative.         Assessment/Plan   Problem List Items Addressed This Visit             ICD-10-CM    Nasal congestion - Primary R09.81     Flu/ Covid PCR to be sent; Will follow up on results as needed  Discussed viral vs bacterial infections  Encouraged daily use of Flonase and Zyrtec for symptom support  Can use Tylenol or Motrin as needed for fever or pain  Increase hydration  Follow up with PCP if not improving over the next several days  ER for any SOB, difficulty breathing, uncontrolled fevers or worsening of symptoms           Relevant Orders    Sars-CoV-2 and Influenza A/B PCR

## 2024-03-25 NOTE — ASSESSMENT & PLAN NOTE
Flu/ Covid PCR to be sent; Will follow up on results as needed  Discussed viral vs bacterial infections  Encouraged daily use of Flonase and Zyrtec for symptom support  Can use Tylenol or Motrin as needed for fever or pain  Increase hydration  Follow up with PCP if not improving over the next several days  ER for any SOB, difficulty breathing, uncontrolled fevers or worsening of symptoms

## 2024-03-26 LAB
FLUAV RNA RESP QL NAA+PROBE: NOT DETECTED
FLUBV RNA RESP QL NAA+PROBE: NOT DETECTED
SARS-COV-2 RNA RESP QL NAA+PROBE: NOT DETECTED

## 2024-03-27 ENCOUNTER — TELEPHONE (OUTPATIENT)
Dept: PRIMARY CARE | Facility: CLINIC | Age: 64
End: 2024-03-27
Payer: COMMERCIAL

## 2024-03-27 NOTE — TELEPHONE ENCOUNTER
Spoke to pt. Still feeling nauseated. Would like a work note for Wed and Thursday. Will  at  tomorrow (3-28-24).

## 2024-04-01 ENCOUNTER — OFFICE VISIT (OUTPATIENT)
Dept: PRIMARY CARE | Facility: CLINIC | Age: 64
End: 2024-04-01
Payer: COMMERCIAL

## 2024-04-01 VITALS
TEMPERATURE: 97.8 F | HEART RATE: 84 BPM | HEIGHT: 59 IN | DIASTOLIC BLOOD PRESSURE: 74 MMHG | SYSTOLIC BLOOD PRESSURE: 116 MMHG | WEIGHT: 147 LBS | OXYGEN SATURATION: 97 % | BODY MASS INDEX: 29.64 KG/M2 | RESPIRATION RATE: 16 BRPM

## 2024-04-01 DIAGNOSIS — J01.90 ACUTE SINUSITIS, RECURRENCE NOT SPECIFIED, UNSPECIFIED LOCATION: Primary | ICD-10-CM

## 2024-04-01 PROCEDURE — 4010F ACE/ARB THERAPY RXD/TAKEN: CPT | Performed by: FAMILY MEDICINE

## 2024-04-01 PROCEDURE — 3046F HEMOGLOBIN A1C LEVEL >9.0%: CPT | Performed by: FAMILY MEDICINE

## 2024-04-01 PROCEDURE — 3078F DIAST BP <80 MM HG: CPT | Performed by: FAMILY MEDICINE

## 2024-04-01 PROCEDURE — 3048F LDL-C <100 MG/DL: CPT | Performed by: FAMILY MEDICINE

## 2024-04-01 PROCEDURE — 3060F POS MICROALBUMINURIA REV: CPT | Performed by: FAMILY MEDICINE

## 2024-04-01 PROCEDURE — 99213 OFFICE O/P EST LOW 20 MIN: CPT | Performed by: FAMILY MEDICINE

## 2024-04-01 PROCEDURE — 3074F SYST BP LT 130 MM HG: CPT | Performed by: FAMILY MEDICINE

## 2024-04-01 RX ORDER — ALENDRONATE SODIUM 70 MG/1
TABLET ORAL
COMMUNITY
Start: 2024-01-14 | End: 2024-06-04 | Stop reason: WASHOUT

## 2024-04-01 RX ORDER — ARIPIPRAZOLE 20 MG/1
TABLET ORAL
COMMUNITY
Start: 2024-02-21 | End: 2024-06-04 | Stop reason: WASHOUT

## 2024-04-01 RX ORDER — CLONAZEPAM 0.5 MG/1
TABLET ORAL
COMMUNITY
Start: 2024-02-24 | End: 2024-06-04 | Stop reason: WASHOUT

## 2024-04-01 RX ORDER — AMOXICILLIN AND CLAVULANATE POTASSIUM 875; 125 MG/1; MG/1
875 TABLET, FILM COATED ORAL 2 TIMES DAILY
Qty: 20 TABLET | Refills: 0 | Status: SHIPPED | OUTPATIENT
Start: 2024-04-01 | End: 2024-04-16 | Stop reason: WASHOUT

## 2024-04-01 RX ORDER — BACLOFEN 20 MG/1
TABLET ORAL
COMMUNITY
Start: 2024-01-27 | End: 2024-06-04 | Stop reason: WASHOUT

## 2024-04-01 RX ORDER — LOSARTAN POTASSIUM 50 MG/1
TABLET ORAL
COMMUNITY
Start: 2024-02-19 | End: 2024-06-04 | Stop reason: WASHOUT

## 2024-04-01 RX ORDER — BUSPIRONE HYDROCHLORIDE 10 MG/1
TABLET ORAL
COMMUNITY
Start: 2024-02-22 | End: 2024-06-04 | Stop reason: WASHOUT

## 2024-04-01 RX ORDER — LINACLOTIDE 290 UG/1
CAPSULE, GELATIN COATED ORAL
COMMUNITY
Start: 2024-02-19 | End: 2024-06-04 | Stop reason: WASHOUT

## 2024-04-01 RX ORDER — CARIPRAZINE 1.5 MG/1
CAPSULE, GELATIN COATED ORAL
COMMUNITY
Start: 2024-01-23 | End: 2024-06-04 | Stop reason: WASHOUT

## 2024-04-01 RX ORDER — ESCITALOPRAM OXALATE 20 MG/1
TABLET ORAL
COMMUNITY
Start: 2024-02-05 | End: 2024-06-04 | Stop reason: WASHOUT

## 2024-04-01 RX ORDER — AMITRIPTYLINE HYDROCHLORIDE 10 MG/1
TABLET, FILM COATED ORAL
COMMUNITY
Start: 2024-02-21 | End: 2024-06-04 | Stop reason: WASHOUT

## 2024-04-01 RX ORDER — PAROXETINE HYDROCHLORIDE 20 MG/1
TABLET, FILM COATED ORAL
COMMUNITY
Start: 2024-02-19 | End: 2024-06-04 | Stop reason: WASHOUT

## 2024-04-01 RX ORDER — ATORVASTATIN CALCIUM 20 MG/1
TABLET, FILM COATED ORAL
COMMUNITY
Start: 2019-03-25 | End: 2024-06-04 | Stop reason: WASHOUT

## 2024-04-01 RX ORDER — DULOXETIN HYDROCHLORIDE 30 MG/1
CAPSULE, DELAYED RELEASE ORAL
COMMUNITY
Start: 2024-02-04 | End: 2024-06-04 | Stop reason: WASHOUT

## 2024-04-01 NOTE — PROGRESS NOTES
"Subjective     Patient ID: Stacie Richmond is a 63 y.o. female who presents for Follow-up (Urgent care follow for Flu like symptoms on 3/25/24./Negative Flu/COVID /Still having fatigue/ weak , decreased appetite ,nausea  and dyspnea on exertion.////).  Sinusitis  This is a chronic problem. The current episode started 1 to 4 weeks ago. The problem has been gradually worsening since onset. There has been no fever. Associated symptoms include congestion, coughing and sinus pressure. Past treatments include nasal decongestants and oral decongestants. The treatment provided no relief.       Review of Systems   HENT:  Positive for congestion and sinus pressure.    Respiratory:  Positive for cough.    All other systems reviewed and are negative.      Objective     Vitals:    04/01/24 1502   BP: 116/74   BP Location: Left arm   Patient Position: Sitting   Pulse: 84   Resp: 16   Temp: 36.6 °C (97.8 °F)   SpO2: 97%   Weight: 66.7 kg (147 lb)   Height: 1.499 m (4' 11\")        Current Outpatient Medications   Medication Instructions    alcohol swabs pads, medicated 1 each, topical (top), 4 times daily, Use prior to checking glucose or injecting insulin    alendronate (Fosamax) 70 mg tablet     amitriptyline (Elavil) 10 mg tablet     ARIPiprazole (Abilify) 20 mg tablet     atorvastatin (Lipitor) 20 mg tablet oral    baclofen (Lioresal) 20 mg tablet     blood sugar diagnostic (Blood Glucose Test) strip 1 each, miscellaneous, 4 times daily, Use to check glucose 4 times daily, before meals and at bedtime    blood-glucose meter misc 1 each, miscellaneous, 4 times daily, Use to check glucose 4 times daily, before meals and at bedtime    busPIRone (Buspar) 10 mg tablet     clonazePAM (KlonoPIN) 0.5 mg tablet     DULoxetine (Cymbalta) 30 mg DR capsule     escitalopram (Lexapro) 20 mg tablet     FreeStyle Tina 2 Sensor kit CHANGE SENSOR EVERY 14 DAYS AS DIRECTED.    insulin aspart, with niacinamide, (Fiasp FlexTouch U-100 Insulin) 100 " "unit/mL (3 mL) injection 8 Units, subcutaneous, 3 times daily with meals    lancets misc 1 each, miscellaneous, 4 times daily, Use to check glucose 4 times daily, before meals and at bedtime    Linzess 290 mcg capsule     lisinopril 10 mg tablet 1 tablet, oral, Daily    losartan (Cozaar) 50 mg tablet     metFORMIN (GLUCOPHAGE) 1,000 mg, oral, 2 times daily    minocycline 100 mg, oral, Daily    Ozempic 2 mg, subcutaneous, Weekly    PARoxetine (Paxil) 20 mg tablet     pen needle, diabetic 32 gauge x 5/32\" needle 1 each, miscellaneous, 4 times daily, Use to inject insulin 4 times daily    Tresiba FlexTouch U-100 25 Units, subcutaneous, Every morning    Vraylar 1.5 mg capsule         Physical Exam  Constitutional:       Appearance: Normal appearance.   HENT:      Head: Normocephalic and atraumatic.      Right Ear: Tympanic membrane normal.      Left Ear: Tympanic membrane normal.      Nose: Congestion present.      Comments: Maxillary sinus tenderness to palpation      Mouth/Throat:      Mouth: Mucous membranes are moist.   Cardiovascular:      Rate and Rhythm: Normal rate and regular rhythm.   Pulmonary:      Effort: Pulmonary effort is normal.      Breath sounds: Normal breath sounds.   Neurological:      Mental Status: She is alert.         Assessment/Plan   Problem List Items Addressed This Visit    None  Visit Diagnoses         Codes    Acute sinusitis, recurrence not specified, unspecified location    -  Primary J01.90    Relevant Medications    amoxicillin-pot clavulanate (Augmentin) 875-125 mg tablet                               "

## 2024-04-02 ASSESSMENT — ENCOUNTER SYMPTOMS
COUGH: 1
SINUS PRESSURE: 1

## 2024-04-12 ENCOUNTER — APPOINTMENT (OUTPATIENT)
Dept: PRIMARY CARE | Facility: CLINIC | Age: 64
End: 2024-04-12
Payer: COMMERCIAL

## 2024-04-16 ENCOUNTER — OFFICE VISIT (OUTPATIENT)
Dept: PRIMARY CARE | Facility: CLINIC | Age: 64
End: 2024-04-16
Payer: COMMERCIAL

## 2024-04-16 VITALS
TEMPERATURE: 97.7 F | RESPIRATION RATE: 18 BRPM | BODY MASS INDEX: 29.49 KG/M2 | OXYGEN SATURATION: 97 % | SYSTOLIC BLOOD PRESSURE: 118 MMHG | HEART RATE: 68 BPM | DIASTOLIC BLOOD PRESSURE: 72 MMHG | WEIGHT: 146 LBS

## 2024-04-16 DIAGNOSIS — E11.65 TYPE 2 DIABETES MELLITUS WITH HYPERGLYCEMIA, WITH LONG-TERM CURRENT USE OF INSULIN (MULTI): ICD-10-CM

## 2024-04-16 DIAGNOSIS — Z79.4 TYPE 2 DIABETES MELLITUS WITH HYPERGLYCEMIA, WITH LONG-TERM CURRENT USE OF INSULIN (MULTI): ICD-10-CM

## 2024-04-16 DIAGNOSIS — R39.9 UTI SYMPTOMS: Primary | ICD-10-CM

## 2024-04-16 LAB
POC APPEARANCE, URINE: CLEAR
POC BILIRUBIN, URINE: NEGATIVE
POC BLOOD, URINE: NEGATIVE
POC COLOR, URINE: YELLOW
POC GLUCOSE, URINE: ABNORMAL MG/DL
POC KETONES, URINE: NEGATIVE MG/DL
POC LEUKOCYTES, URINE: NEGATIVE
POC NITRITE,URINE: POSITIVE
POC PH, URINE: 6 PH
POC PROTEIN, URINE: NEGATIVE MG/DL
POC SPECIFIC GRAVITY, URINE: 1.02
POC UROBILINOGEN, URINE: 0.2 EU/DL

## 2024-04-16 PROCEDURE — 3060F POS MICROALBUMINURIA REV: CPT | Performed by: NURSE PRACTITIONER

## 2024-04-16 PROCEDURE — 3048F LDL-C <100 MG/DL: CPT | Performed by: NURSE PRACTITIONER

## 2024-04-16 PROCEDURE — 1036F TOBACCO NON-USER: CPT | Performed by: NURSE PRACTITIONER

## 2024-04-16 PROCEDURE — 4010F ACE/ARB THERAPY RXD/TAKEN: CPT | Performed by: NURSE PRACTITIONER

## 2024-04-16 PROCEDURE — 99213 OFFICE O/P EST LOW 20 MIN: CPT | Performed by: NURSE PRACTITIONER

## 2024-04-16 PROCEDURE — 3074F SYST BP LT 130 MM HG: CPT | Performed by: NURSE PRACTITIONER

## 2024-04-16 PROCEDURE — 3046F HEMOGLOBIN A1C LEVEL >9.0%: CPT | Performed by: NURSE PRACTITIONER

## 2024-04-16 PROCEDURE — 36415 COLL VENOUS BLD VENIPUNCTURE: CPT

## 2024-04-16 PROCEDURE — 81002 URINALYSIS NONAUTO W/O SCOPE: CPT | Performed by: NURSE PRACTITIONER

## 2024-04-16 PROCEDURE — 3078F DIAST BP <80 MM HG: CPT | Performed by: NURSE PRACTITIONER

## 2024-04-16 RX ORDER — NITROFURANTOIN 25; 75 MG/1; MG/1
100 CAPSULE ORAL 2 TIMES DAILY
Qty: 10 CAPSULE | Refills: 0 | Status: SHIPPED | OUTPATIENT
Start: 2024-04-16 | End: 2024-04-21

## 2024-04-16 ASSESSMENT — ENCOUNTER SYMPTOMS
FEVER: 0
APPETITE CHANGE: 1
FLANK PAIN: 1
DIARRHEA: 0
CARDIOVASCULAR NEGATIVE: 1
HEMATURIA: 0
VOMITING: 0
DYSURIA: 1
FREQUENCY: 1
FATIGUE: 0
RESPIRATORY NEGATIVE: 1
NAUSEA: 1
CHILLS: 1

## 2024-04-16 NOTE — PROGRESS NOTES
Subjective   Patient ID: Stacie Richmond is a 63 y.o. female who presents for UTI.    Symptoms started yesterday with back pain, chills, nausea and odorous urine. Patient has urinary frequency. Slight urgency. No blood in the urine. No fevers or abdominal pain.     Review of Systems   Constitutional:  Positive for appetite change and chills. Negative for fatigue and fever.   HENT: Negative.     Respiratory: Negative.     Cardiovascular: Negative.    Gastrointestinal:  Positive for nausea. Negative for diarrhea and vomiting.   Genitourinary:  Positive for dysuria, flank pain, frequency and urgency. Negative for hematuria.       Objective   /72   Pulse 68   Temp 36.5 °C (97.7 °F) (Temporal)   Resp 18   Wt 66.2 kg (146 lb)   SpO2 97%   BMI 29.49 kg/m²     Physical Exam  Vitals reviewed.   Constitutional:       General: She is not in acute distress.     Appearance: Normal appearance. She is not ill-appearing or toxic-appearing.   HENT:      Head: Atraumatic.   Eyes:      Conjunctiva/sclera: Conjunctivae normal.   Cardiovascular:      Rate and Rhythm: Normal rate and regular rhythm.      Heart sounds: Normal heart sounds. No murmur heard.  Pulmonary:      Effort: Pulmonary effort is normal.      Breath sounds: Normal breath sounds. No wheezing or rhonchi.   Abdominal:      General: Bowel sounds are normal.      Palpations: Abdomen is soft.      Tenderness: There is no abdominal tenderness. There is no right CVA tenderness, left CVA tenderness or guarding.   Skin:     General: Skin is warm and dry.   Neurological:      General: No focal deficit present.      Mental Status: She is alert.     Assessment/Plan   Problem List Items Addressed This Visit             ICD-10-CM    Type 2 diabetes mellitus with hyperglycemia, with long-term current use of insulin (Multi) E11.65, Z79.4     Other Visit Diagnoses         Codes    UTI symptoms    -  Primary R39.9    Relevant Medications    nitrofurantoin,  macrocrystal-monohydrate, (Macrobid) 100 mg capsule    Other Relevant Orders    POCT UA (nonautomated) manually resulted (Completed)    Urine Culture        UA indicative of a UTI. will start patient on macrobid at this time. will send urine out for culture. patient advised to call the office if symptoms persist in the next 2-3 days despite the use of the medication. patient advised to go to the ER for any fevers, chills, abdominal pain, nausea, vomiting or new/concerning symptoms; she agreed. will call pt when urine culture results become available.

## 2024-04-22 DIAGNOSIS — Z12.31 ENCOUNTER FOR SCREENING MAMMOGRAM FOR BREAST CANCER: ICD-10-CM

## 2024-04-26 ENCOUNTER — TELEPHONE (OUTPATIENT)
Dept: PRIMARY CARE | Facility: CLINIC | Age: 64
End: 2024-04-26
Payer: COMMERCIAL

## 2024-04-26 DIAGNOSIS — N30.00 ACUTE CYSTITIS WITHOUT HEMATURIA: Primary | ICD-10-CM

## 2024-04-26 RX ORDER — CIPROFLOXACIN 500 MG/1
500 TABLET ORAL 2 TIMES DAILY
Qty: 10 TABLET | Refills: 0 | Status: SHIPPED | OUTPATIENT
Start: 2024-04-26 | End: 2024-05-01

## 2024-04-26 NOTE — TELEPHONE ENCOUNTER
PT called and asked for medication for UTI.  She said she went to walk in clinic( [facility])  two weeks ago and they gave her an anti-biotic then she [place] snot know what type.  She said she still has the symptoms.  Please advise local pharmacy

## 2024-06-03 PROBLEM — E78.5 DYSLIPIDEMIA: Status: ACTIVE | Noted: 2024-06-03

## 2024-06-03 PROBLEM — E11.9 DIABETES MELLITUS TYPE 2 WITHOUT RETINOPATHY (MULTI): Status: ACTIVE | Noted: 2024-06-03

## 2024-06-03 PROBLEM — R79.89 ABNORMAL LFTS: Status: ACTIVE | Noted: 2024-06-03

## 2024-06-03 PROBLEM — E55.9 VITAMIN D DEFICIENCY: Status: ACTIVE | Noted: 2024-06-03

## 2024-06-04 ENCOUNTER — OFFICE VISIT (OUTPATIENT)
Dept: ENDOCRINOLOGY | Facility: CLINIC | Age: 64
End: 2024-06-04
Payer: COMMERCIAL

## 2024-06-04 ENCOUNTER — LAB (OUTPATIENT)
Dept: LAB | Facility: LAB | Age: 64
End: 2024-06-04
Payer: COMMERCIAL

## 2024-06-04 VITALS
DIASTOLIC BLOOD PRESSURE: 83 MMHG | BODY MASS INDEX: 29.29 KG/M2 | HEART RATE: 85 BPM | HEIGHT: 59 IN | SYSTOLIC BLOOD PRESSURE: 131 MMHG | WEIGHT: 145.31 LBS

## 2024-06-04 DIAGNOSIS — E11.65 TYPE 2 DIABETES MELLITUS WITH HYPERGLYCEMIA, WITH LONG-TERM CURRENT USE OF INSULIN (MULTI): ICD-10-CM

## 2024-06-04 DIAGNOSIS — E11.65 TYPE 2 DIABETES MELLITUS WITH HYPERGLYCEMIA, WITH LONG-TERM CURRENT USE OF INSULIN (MULTI): Primary | ICD-10-CM

## 2024-06-04 DIAGNOSIS — Z79.4 TYPE 2 DIABETES MELLITUS WITH HYPERGLYCEMIA, WITH LONG-TERM CURRENT USE OF INSULIN (MULTI): Primary | ICD-10-CM

## 2024-06-04 DIAGNOSIS — Z79.4 TYPE 2 DIABETES MELLITUS WITH HYPERGLYCEMIA, WITH LONG-TERM CURRENT USE OF INSULIN (MULTI): ICD-10-CM

## 2024-06-04 LAB
EST. AVERAGE GLUCOSE BLD GHB EST-MCNC: 306 MG/DL
HBA1C MFR BLD: 12.3 %

## 2024-06-04 PROCEDURE — 3079F DIAST BP 80-89 MM HG: CPT | Performed by: PHYSICIAN ASSISTANT

## 2024-06-04 PROCEDURE — 3075F SYST BP GE 130 - 139MM HG: CPT | Performed by: PHYSICIAN ASSISTANT

## 2024-06-04 PROCEDURE — 83036 HEMOGLOBIN GLYCOSYLATED A1C: CPT

## 2024-06-04 PROCEDURE — 3060F POS MICROALBUMINURIA REV: CPT | Performed by: PHYSICIAN ASSISTANT

## 2024-06-04 PROCEDURE — 36415 COLL VENOUS BLD VENIPUNCTURE: CPT

## 2024-06-04 PROCEDURE — 1036F TOBACCO NON-USER: CPT | Performed by: PHYSICIAN ASSISTANT

## 2024-06-04 PROCEDURE — 99214 OFFICE O/P EST MOD 30 MIN: CPT | Performed by: PHYSICIAN ASSISTANT

## 2024-06-04 PROCEDURE — 3046F HEMOGLOBIN A1C LEVEL >9.0%: CPT | Performed by: PHYSICIAN ASSISTANT

## 2024-06-04 PROCEDURE — 4010F ACE/ARB THERAPY RXD/TAKEN: CPT | Performed by: PHYSICIAN ASSISTANT

## 2024-06-04 PROCEDURE — 3048F LDL-C <100 MG/DL: CPT | Performed by: PHYSICIAN ASSISTANT

## 2024-06-04 RX ORDER — PEN NEEDLE, DIABETIC 30 GX3/16"
1 NEEDLE, DISPOSABLE MISCELLANEOUS 4 TIMES DAILY
Qty: 400 EACH | Refills: 3 | Status: SHIPPED | OUTPATIENT
Start: 2024-06-04 | End: 2025-06-04

## 2024-06-04 RX ORDER — SEMAGLUTIDE 2.68 MG/ML
2 INJECTION, SOLUTION SUBCUTANEOUS
Qty: 9 ML | Refills: 3 | Status: SHIPPED | OUTPATIENT
Start: 2024-06-09 | End: 2025-06-09

## 2024-06-04 RX ORDER — FLASH GLUCOSE SENSOR
KIT MISCELLANEOUS
Qty: 6 EACH | Refills: 3 | Status: SHIPPED | OUTPATIENT
Start: 2024-06-04

## 2024-06-04 RX ORDER — LANCETS
1 EACH MISCELLANEOUS 4 TIMES DAILY
Qty: 400 EACH | Refills: 3 | Status: SHIPPED | OUTPATIENT
Start: 2024-06-04 | End: 2025-06-04

## 2024-06-04 RX ORDER — METFORMIN HYDROCHLORIDE 1000 MG/1
1000 TABLET ORAL 2 TIMES DAILY
Qty: 180 TABLET | Refills: 3 | Status: SHIPPED | OUTPATIENT
Start: 2024-06-04 | End: 2025-06-04

## 2024-06-04 RX ORDER — LISINOPRIL 10 MG/1
10 TABLET ORAL DAILY
Qty: 90 TABLET | Refills: 3 | Status: SHIPPED | OUTPATIENT
Start: 2024-06-04 | End: 2025-06-04

## 2024-06-04 RX ORDER — SEMAGLUTIDE 2.68 MG/ML
2 INJECTION, SOLUTION SUBCUTANEOUS
Qty: 9 ML | Refills: 3 | Status: SHIPPED | OUTPATIENT
Start: 2024-06-09 | End: 2024-06-04

## 2024-06-04 RX ORDER — IBUPROFEN 200 MG
1 CAPSULE ORAL 4 TIMES DAILY
Qty: 400 STRIP | Refills: 3 | Status: SHIPPED | OUTPATIENT
Start: 2024-06-04 | End: 2025-06-04

## 2024-06-04 ASSESSMENT — COLUMBIA-SUICIDE SEVERITY RATING SCALE - C-SSRS
2. HAVE YOU ACTUALLY HAD ANY THOUGHTS OF KILLING YOURSELF?: NO
6. HAVE YOU EVER DONE ANYTHING, STARTED TO DO ANYTHING, OR PREPARED TO DO ANYTHING TO END YOUR LIFE?: NO
1. IN THE PAST MONTH, HAVE YOU WISHED YOU WERE DEAD OR WISHED YOU COULD GO TO SLEEP AND NOT WAKE UP?: NO

## 2024-06-04 ASSESSMENT — PAIN SCALES - GENERAL: PAINLEVEL: 0-NO PAIN

## 2024-06-04 NOTE — PROGRESS NOTES
"Subjective   Stacie Richmond is a 63 y.o. female who presents for follow-up of Type 2 diabetes mellitus. The initial diagnosis of diabetes was made in 2002 . The patient does have a known family history of diabetes.    Known complications due to diabetes included obesity    Cardiovascular risk factors include diabetes mellitus, hypertension, and obesity (BMI >= 30 kg/m2). The patient is on an ACE inhibitor or angiotensin II receptor blocker.  The patient has not been previously hospitalized due to diabetic ketoacidosis.     Current symptoms/problems include hyperglycemia. Her  diabetes treatment has not been disrupted, though she is overdue for A1C assessment .     Current diabetes regimen is as follows:  ozempic 2mg, metformin 1000mg twice daily, tresiba 25u at bedtime, novolog 8u with meals.       The patient is not currently checking the blood glucose. She intends to resume cgm wear    Patient is using: both glucometer and continuous glucose monitor    Hypoglycemia frequency: none  Hypoglycemia awareness: Yes     Exercise: rarely   Meal panning: She is using avoidance of concentrated sweets.    Review of Systems   All other systems reviewed and are negative.      Objective   /83 (BP Location: Left arm, Patient Position: Sitting)   Pulse 85   Ht 1.499 m (4' 11\")   Wt 65.9 kg (145 lb 5 oz)   BMI 29.35 kg/m²   Physical Exam  Constitutional:       Appearance: Normal appearance.      Comments: Overweight, otherwise appears well   HENT:      Head: Normocephalic and atraumatic.      Nose: Nose normal.      Mouth/Throat:      Mouth: Mucous membranes are dry.      Pharynx: Oropharynx is clear.   Eyes:      Extraocular Movements: Extraocular movements intact.      Conjunctiva/sclera: Conjunctivae normal.   Cardiovascular:      Rate and Rhythm: Normal rate and regular rhythm.      Pulses: Normal pulses.      Heart sounds: Normal heart sounds.   Pulmonary:      Effort: Pulmonary effort is normal.      Breath " sounds: Normal breath sounds.   Abdominal:      General: Abdomen is flat. Bowel sounds are normal.      Palpations: Abdomen is soft.   Skin:     General: Skin is warm and dry.   Neurological:      General: No focal deficit present.      Mental Status: She is alert and oriented to person, place, and time. Mental status is at baseline.   Psychiatric:         Mood and Affect: Mood normal.         Behavior: Behavior normal.         Thought Content: Thought content normal.         Judgment: Judgment normal.         Lab Review  Glucose (mg/dL)   Date Value   01/17/2024 106 (H)   08/28/2023 249 (H)   03/22/2023 318 (H)   12/12/2022 242 (H)     Hemoglobin A1C (%)   Date Value   01/17/2024 14.0 (H)   04/26/2022 12.8 (A)     Bicarbonate (mmol/L)   Date Value   01/17/2024 28   08/28/2023 30   03/22/2023 28   12/12/2022 28     Urea Nitrogen (mg/dL)   Date Value   01/17/2024 17   08/28/2023 12   03/22/2023 15   12/12/2022 17     Creatinine (mg/dL)   Date Value   01/17/2024 0.48 (L)   08/28/2023 0.63   03/22/2023 0.52   12/12/2022 0.52       Health Maintenance:   Foot Exam: updated today  Eye Exam: due for update, scheduled for 6/11/24  Lipid Panel: up to date and at goal LDL <70 as of 1/17/24  Urine Albumin: up to date and above goal <30 as of 1/17/24    Assessment/Plan   Diagnoses and all orders for this visit:  Type 2 diabetes mellitus with hyperglycemia, with long-term current use of insulin (Multi)  -     semaglutide (Ozempic) 2 mg/dose (8 mg/3 mL) pen injector; Inject 2 mg under the skin 1 (one) time per week.  -     metFORMIN (Glucophage) 1,000 mg tablet; Take 1 tablet (1,000 mg) by mouth 2 times a day.  -     lisinopril 10 mg tablet; Take 1 tablet (10 mg) by mouth once daily.  -     blood sugar diagnostic (Blood Glucose Test) strip; 1 each 4 times a day. Use to check glucose 4 times daily, before meals and at bedtime  -     FreeStyle Tina 2 Sensor kit; CHANGE SENSOR EVERY 14 DAYS AS DIRECTED.  -     lancets misc; 1 each  "4 times a day. Use to check glucose 4 times daily, before meals and at bedtime  -     pen needle, diabetic 32 gauge x 5/32\" needle; 1 each 4 times a day. Use to inject insulin 4 times daily      Type 2 diabetes mellitus, is not at goal. A1C due for update    RX changes: none   Education:  interpretation of lab results, blood sugar goals, and self-monitoring of blood glucose skills  Follow up: I recommend diabetes care be transferred to new  endocrinologist in 4 months    Drink extra water     Try eat foods in order of 1 - green vegetables, 2 - protein, 3 - starch/carbs     CONTINUE NOVOLOG/FIASP as 8 units injected 15 minutes before your meals      CONTINUE TRESIBA to 25 units once daily      CONTINUE METFORMIN 1000mg as 1 tablet twice a day with meals     CONTINUE OZEMPIC to 2mg once a week, you may start Tums for heartburn or nausea you may take sugar free metamucil for constipation if you experience these side effects     Please continue to check your glucose 4 times a day (before each meal and at bedtime).  - use Freestyle shayy on your cell phone mike to monitor your sugar       "

## 2024-06-04 NOTE — PATIENT INSTRUCTIONS
"Type 2 diabetes mellitus with hyperglycemia, with long-term current use of insulin (Multi)  -     semaglutide (Ozempic) 2 mg/dose (8 mg/3 mL) pen injector; Inject 2 mg under the skin 1 (one) time per week.  -     metFORMIN (Glucophage) 1,000 mg tablet; Take 1 tablet (1,000 mg) by mouth 2 times a day.  -     lisinopril 10 mg tablet; Take 1 tablet (10 mg) by mouth once daily.  -     blood sugar diagnostic (Blood Glucose Test) strip; 1 each 4 times a day. Use to check glucose 4 times daily, before meals and at bedtime  -     FreeStyle Tina 2 Sensor kit; CHANGE SENSOR EVERY 14 DAYS AS DIRECTED.  -     lancets misc; 1 each 4 times a day. Use to check glucose 4 times daily, before meals and at bedtime  -     pen needle, diabetic 32 gauge x 5/32\" needle; 1 each 4 times a day. Use to inject insulin 4 times daily        Type 2 diabetes mellitus, is not at goal. A1C due for update     RX changes: none   Education:  interpretation of lab results, blood sugar goals, and self-monitoring of blood glucose skills  Follow up: I recommend diabetes care be      Drink extra water     Try eat foods in order of 1 - green vegetables, 2 - protein, 3 - starch/carbs     CONTINUE NOVOLOG/FIASP as 8 units injected 15 minutes before your meals      CONTINUE TRESIBA to 25 units once daily      CONTINUE METFORMIN 1000mg as 1 tablet twice a day with meals     CONTINUE OZEMPIC to 2mg once a week, you may start Tums for heartburn or nausea you may take sugar free metamucil for constipation if you experience these side effects     Please continue to check your glucose 4 times a day (before each meal and at bedtime).  - use Freestyle tina on your cell phone mike to monitor your sugar  "

## 2024-06-11 ENCOUNTER — APPOINTMENT (OUTPATIENT)
Dept: OPHTHALMOLOGY | Facility: CLINIC | Age: 64
End: 2024-06-11
Payer: COMMERCIAL

## 2024-06-11 DIAGNOSIS — H52.4 HYPEROPIA WITH PRESBYOPIA OF BOTH EYES: ICD-10-CM

## 2024-06-11 DIAGNOSIS — E11.3592 PROLIFERATIVE DIABETIC RETINOPATHY OF LEFT EYE ASSOCIATED WITH TYPE 2 DIABETES MELLITUS, UNSPECIFIED PROLIFERATIVE RETINOPATHY TYPE (MULTI): ICD-10-CM

## 2024-06-11 DIAGNOSIS — H52.03 HYPEROPIA WITH PRESBYOPIA OF BOTH EYES: ICD-10-CM

## 2024-06-11 DIAGNOSIS — H25.813 COMBINED FORMS OF AGE-RELATED CATARACT OF BOTH EYES: ICD-10-CM

## 2024-06-11 DIAGNOSIS — E11.3311 MODERATE NONPROLIFERATIVE DIABETIC RETINOPATHY OF RIGHT EYE WITH MACULAR EDEMA ASSOCIATED WITH TYPE 2 DIABETES MELLITUS (MULTI): Primary | ICD-10-CM

## 2024-06-11 PROBLEM — E11.3299 NPDR (NONPROLIFERATIVE DIABETIC RETINOPATHY) (MULTI): Status: ACTIVE | Noted: 2024-06-11

## 2024-06-11 PROCEDURE — 92134 CPTRZ OPH DX IMG PST SGM RTA: CPT | Performed by: OPTOMETRIST

## 2024-06-11 PROCEDURE — 92014 COMPRE OPH EXAM EST PT 1/>: CPT | Performed by: OPTOMETRIST

## 2024-06-11 PROCEDURE — 92015 DETERMINE REFRACTIVE STATE: CPT | Performed by: OPTOMETRIST

## 2024-06-11 ASSESSMENT — EXTERNAL EXAM - RIGHT EYE: OD_EXAM: SKIN  - ROSACEA

## 2024-06-11 ASSESSMENT — VISUAL ACUITY
METHOD: SNELLEN - LINEAR
OD_BAT_HIGH: 20/40
OD_SC: 20/40
OS_SC: 20/400
OS_BAT_HIGH: 20/125
OD_SC+: +2

## 2024-06-11 ASSESSMENT — REFRACTION_MANIFEST
OD_ADD: +2.50
OD_SPHERE: +1.00
OS_AXIS: 167
OS_SPHERE: +0.75
OS_ADD: +2.50
OD_CYLINDER: SPHERE
OS_CYLINDER: -1.00

## 2024-06-11 ASSESSMENT — CUP TO DISC RATIO
OD_RATIO: .4
OS_RATIO: .4

## 2024-06-11 ASSESSMENT — TONOMETRY
OS_IOP_MMHG: 12
IOP_METHOD: GOLDMANN APPLANATION
OD_IOP_MMHG: 14

## 2024-06-11 ASSESSMENT — CONF VISUAL FIELD
OS_SUPERIOR_NASAL_RESTRICTION: 0
OS_INFERIOR_NASAL_RESTRICTION: 0
OS_INFERIOR_TEMPORAL_RESTRICTION: 0
OD_SUPERIOR_TEMPORAL_RESTRICTION: 0
OD_NORMAL: 1
OD_INFERIOR_NASAL_RESTRICTION: 0
OS_SUPERIOR_TEMPORAL_RESTRICTION: 0
METHOD: COUNTING FINGERS
OS_NORMAL: 1
OD_INFERIOR_TEMPORAL_RESTRICTION: 0
OD_SUPERIOR_NASAL_RESTRICTION: 0

## 2024-06-11 ASSESSMENT — ENCOUNTER SYMPTOMS
GASTROINTESTINAL NEGATIVE: 0
EYES NEGATIVE: 1
HEMATOLOGIC/LYMPHATIC NEGATIVE: 0
CONSTITUTIONAL NEGATIVE: 0
CARDIOVASCULAR NEGATIVE: 0
MUSCULOSKELETAL NEGATIVE: 0
NEUROLOGICAL NEGATIVE: 0
ENDOCRINE NEGATIVE: 0
RESPIRATORY NEGATIVE: 0
ALLERGIC/IMMUNOLOGIC NEGATIVE: 0
PSYCHIATRIC NEGATIVE: 0

## 2024-06-11 ASSESSMENT — EXTERNAL EXAM - LEFT EYE: OS_EXAM: SKIN  - ROSACEA

## 2024-06-11 ASSESSMENT — SLIT LAMP EXAM - LIDS
COMMENTS: 3+ MGD WITH TOOTHPASTE SECRETIONS
COMMENTS: 3+ MGD WITH TOOTHPASTE SECRETIONS

## 2024-06-11 NOTE — PROGRESS NOTES
Hx of MGD and Started WC`s BID, start teatree oil application to eyelids,.  has been doing well using just AT`s  QD.      Optical coherence tomography of the macula revealed:    OD: Mildly irregular foveal contour, photoreceptor, retinal pigment epithelium, IS/OS junction, central field 292 was 279 was 265 micron.  elevation/edema outside of MISSAEL and over 500 micron away from foveal center.  Vitreous base visualized. Apparent worsening of mild extrafoveal edema OD.    OS:  Mildly irregular foveal contour, photoreceptor, retinal pigment epithelium, IS/OS junction, central field unable to image was 271 was 257 micron.  Vitreous base visualized.   I discussed the value of good blood sugar control under your management and annual eye exams.       Severe NPDR OD with macula edema that does not appear to be clinically significant.  PDR OS with vitreous hemorrhage.        VA 20/30 OD 20/60 OS BAT 20/40 OD 20/125 OS was  20/25 OD and OS.      Referred to retina service for evaluation and management.

## 2024-06-24 ENCOUNTER — APPOINTMENT (OUTPATIENT)
Dept: OPHTHALMOLOGY | Facility: CLINIC | Age: 64
End: 2024-06-24
Payer: COMMERCIAL

## 2024-06-24 DIAGNOSIS — E11.3512 TYPE 2 DIABETES MELLITUS WITH PROLIFERATIVE RETINOPATHY OF LEFT EYE AND MACULAR EDEMA, UNSPECIFIED WHETHER LONG TERM INSULIN USE (MULTI): ICD-10-CM

## 2024-06-24 DIAGNOSIS — E11.3411: Primary | ICD-10-CM

## 2024-06-24 PROCEDURE — 92134 CPTRZ OPH DX IMG PST SGM RTA: CPT | Performed by: OPHTHALMOLOGY

## 2024-06-24 PROCEDURE — 99213 OFFICE O/P EST LOW 20 MIN: CPT | Performed by: OPHTHALMOLOGY

## 2024-06-24 RX ORDER — ERGOCALCIFEROL 1.25 MG/1
CAPSULE ORAL
COMMUNITY
Start: 2022-12-16

## 2024-06-24 ASSESSMENT — CUP TO DISC RATIO
OD_RATIO: .4
OS_RATIO: .4

## 2024-06-24 ASSESSMENT — SLIT LAMP EXAM - LIDS
COMMENTS: 3+ MGD WITH TOOTHPASTE SECRETIONS
COMMENTS: 3+ MGD WITH TOOTHPASTE SECRETIONS

## 2024-06-24 ASSESSMENT — TONOMETRY
IOP_METHOD: GOLDMANN APPLANATION
OS_IOP_MMHG: 11
OD_IOP_MMHG: 14

## 2024-06-24 ASSESSMENT — ENCOUNTER SYMPTOMS
MUSCULOSKELETAL NEGATIVE: 0
CONSTITUTIONAL NEGATIVE: 0
NEUROLOGICAL NEGATIVE: 0
RESPIRATORY NEGATIVE: 0
ENDOCRINE NEGATIVE: 0
EYES NEGATIVE: 1
CARDIOVASCULAR NEGATIVE: 0
ALLERGIC/IMMUNOLOGIC NEGATIVE: 0
HEMATOLOGIC/LYMPHATIC NEGATIVE: 0
GASTROINTESTINAL NEGATIVE: 0
PSYCHIATRIC NEGATIVE: 0

## 2024-06-24 ASSESSMENT — VISUAL ACUITY
OS_SC+: -2
OS_SC: 20/30
METHOD: SNELLEN - LINEAR
OD_SC: 20/25
OD_SC+: -2

## 2024-06-24 ASSESSMENT — EXTERNAL EXAM - LEFT EYE: OS_EXAM: SKIN  - ROSACEA

## 2024-06-24 ASSESSMENT — EXTERNAL EXAM - RIGHT EYE: OD_EXAM: SKIN  - ROSACEA

## 2024-06-24 NOTE — PROGRESS NOTES
"Subjective   Patient ID: Stacie Richmond is a 63 y.o. female.    Chief Complaint    Follow-up       HPI    63 year old female present for Retina Eval Npv referred by . pt is a Type 2 iddm A1c 12.3 blood sugar 164 taken today, pt has no hx of any eye sx, pt has family hx of Glaucoma (mother), no family hx of macular degeneration,no trauma to OU, pt states that she has pressure in OS>OD, spots in OS, Light sensitivity in OU,flashes of light OS.  Last edited by Marley Pereira on 6/24/2024 10:38 AM.        No current outpatient medications on file. (Ophthalmology pharm classes)       Current Outpatient Medications (Other)   Medication Sig Dispense Refill    alcohol swabs pads, medicated Apply 1 each topically 4 times a day. Use prior to checking glucose or injecting insulin 400 each 3    blood sugar diagnostic (Blood Glucose Test) strip 1 each 4 times a day. Use to check glucose 4 times daily, before meals and at bedtime 400 strip 3    ergocalciferol (Vitamin D-2) 1.25 MG (67800 UT) capsule Take by mouth.      FreeStyle Tina 2 Sensor kit CHANGE SENSOR EVERY 14 DAYS AS DIRECTED. 6 each 3    insulin aspart, with niacinamide, (Fiasp FlexTouch U-100 Insulin) 100 unit/mL (3 mL) injection Inject 8 Units under the skin 3 times a day with meals. 21.6 mL 3    lancets misc 1 each 4 times a day. Use to check glucose 4 times daily, before meals and at bedtime 400 each 3    lisinopril 10 mg tablet Take 1 tablet (10 mg) by mouth once daily. 90 tablet 3    metFORMIN (Glucophage) 1,000 mg tablet Take 1 tablet (1,000 mg) by mouth 2 times a day. 180 tablet 3    pen needle, diabetic 32 gauge x 5/32\" needle 1 each 4 times a day. Use to inject insulin 4 times daily 400 each 3    semaglutide (Ozempic) 2 mg/dose (8 mg/3 mL) pen injector Inject 2 mg under the skin 1 (one) time per week. 9 mL 3    Tresiba FlexTouch U-100 100 unit/mL (3 mL) injection Inject 25 Units under the skin once daily in the morning. 22.5 mL 3 "       Objective   Base Eye Exam       Visual Acuity (Snellen - Linear)         Right Left    Dist sc 20/25 -2 20/30 -2              Tonometry (Goldmann Applanation, 10:44 AM)         Right Left    Pressure 14 11              Neuro/Psych       Oriented x3: Yes    Mood/Affect: Normal              Dilation       Both eyes: 1% Mydriacyl & 2.5% Rickie  @ 10:44 AM                  Slit Lamp and Fundus Exam       External Exam         Right Left    External Skin  - Rosacea Skin  - Rosacea              Slit Lamp Exam         Right Left    Lids/Lashes 3+ MGD with toothpaste secretions 3+ MGD with toothpaste secretions    Conjunctiva/Sclera White and quiet. Pingueculae nasally and temporallyno reaction on eversion mild injection 360 with temp pingueculae    Cornea 1+ inf PEE, Endothelial pigmentEarly band keratopathy temporally 1+ inf PEE, Endothelial pigmentEarly band keratopathy temporally    Anterior Chamber Deep and quiet Deep and quiet    Iris Round and reactive Round and reactive    Lens 2+ Nuclear Sclerosis 2+ Nuclear Sclerosis    Anterior Vitreous Clear hemorrhage              Fundus Exam         Right Left    Disc Pink and sharp Pink and sharp    C/D Ratio .4 .4    Macula severe non proliferative DR, mild exudate outside MISSAEL severe non proliferative DR, mild exudate outside MISSAEL    Vessels Normal Normal    Periphery Normal Normal                  Imaging    Macula OCT 06/24/24  Right eye (OD): Edema with IRF outside of fovea, preserved foveal contour, mild DRIL  Left eye (OS): Edema with IRF outside of fovea, preserved foveal contour, mild DRIL    Assessment/Plan       Severe non-proliferative diabetic retinopathy (NPDR) with macular edema right eye  PDR left eye   Vitreous hemorrhage left eye  -Reports vision the same, still very blurry left eye  -BCVA 20/25 right eye and 20/30 left eye today  -Edema both eyes but not at fovea, blurred vision left eye likely secondary to VH  -PDR left eye with VH, will allow 2 weeks for  VH to settle and improve view for laser, ideally get panretinal photocoagulation (PRP) at that time.   -If VH fails to improve or worsens would need to consider pars plana vitrectomy (PPV)/endolaser (EL)  -Right eye with severe non-proliferative diabetic retinopathy (NPDR) , no NVI or neovascularization of the disc (NVD), no clear areas of neovascularization elsewhere (NVE) but suspicious.   -Once get left eye stablizied would want OPTOS fluorescein angiography (FA) to assess for signs of subtle neovascularization elsewhere (NVE) right eye   -Follow up 2 weeks sooner PRN Naomy or Ventura for possible panretinal photocoagulation (PRP) left eye

## 2024-07-02 DIAGNOSIS — E11.65 TYPE 2 DIABETES MELLITUS WITH HYPERGLYCEMIA, WITH LONG-TERM CURRENT USE OF INSULIN (MULTI): Primary | ICD-10-CM

## 2024-07-02 DIAGNOSIS — Z79.4 TYPE 2 DIABETES MELLITUS WITH HYPERGLYCEMIA, WITH LONG-TERM CURRENT USE OF INSULIN (MULTI): Primary | ICD-10-CM

## 2024-07-02 RX ORDER — BLOOD-GLUCOSE,RECEIVER,CONT
EACH MISCELLANEOUS
Qty: 1 EACH | Refills: 0 | Status: SHIPPED | OUTPATIENT
Start: 2024-07-02

## 2024-07-02 RX ORDER — BLOOD-GLUCOSE SENSOR
EACH MISCELLANEOUS
Qty: 3 EACH | Refills: 11 | Status: SHIPPED | OUTPATIENT
Start: 2024-07-02

## 2024-07-02 NOTE — PROGRESS NOTES
Pt's insurance sent notice of dexcom preferred over shayy.     Regi Schultz PA-C   Endocrinology  Inpatient Diabetes Management Team

## 2024-07-08 ENCOUNTER — APPOINTMENT (OUTPATIENT)
Dept: OPHTHALMOLOGY | Facility: CLINIC | Age: 64
End: 2024-07-08
Payer: COMMERCIAL

## 2024-07-08 DIAGNOSIS — E11.3411: ICD-10-CM

## 2024-07-08 DIAGNOSIS — E11.3592 PROLIFERATIVE DIABETIC RETINOPATHY OF LEFT EYE ASSOCIATED WITH TYPE 2 DIABETES MELLITUS, UNSPECIFIED PROLIFERATIVE RETINOPATHY TYPE (MULTI): Primary | ICD-10-CM

## 2024-07-08 DIAGNOSIS — H43.12 VITREOUS HEMORRHAGE OF LEFT EYE (MULTI): ICD-10-CM

## 2024-07-08 PROCEDURE — 92134 CPTRZ OPH DX IMG PST SGM RTA: CPT | Performed by: OPHTHALMOLOGY

## 2024-07-08 PROCEDURE — 99213 OFFICE O/P EST LOW 20 MIN: CPT | Performed by: OPHTHALMOLOGY

## 2024-07-08 ASSESSMENT — VISUAL ACUITY
OD_SC: 20/30
METHOD: SNELLEN - LINEAR
OS_SC: 20/40
OD_SC+: -2

## 2024-07-08 ASSESSMENT — CUP TO DISC RATIO
OD_RATIO: .4
OS_RATIO: .4

## 2024-07-08 ASSESSMENT — CONF VISUAL FIELD
OS_SUPERIOR_TEMPORAL_RESTRICTION: 0
OS_INFERIOR_NASAL_RESTRICTION: 0
OS_INFERIOR_TEMPORAL_RESTRICTION: 0
OD_SUPERIOR_TEMPORAL_RESTRICTION: 0
OD_NORMAL: 1
OS_SUPERIOR_NASAL_RESTRICTION: 0
OD_INFERIOR_NASAL_RESTRICTION: 0
OS_NORMAL: 1
OD_INFERIOR_TEMPORAL_RESTRICTION: 0
OD_SUPERIOR_NASAL_RESTRICTION: 0

## 2024-07-08 ASSESSMENT — TONOMETRY
OD_IOP_MMHG: 12
OS_IOP_MMHG: 11
IOP_METHOD: GOLDMANN APPLANATION

## 2024-07-08 ASSESSMENT — SLIT LAMP EXAM - LIDS
COMMENTS: 3+ MGD WITH TOOTHPASTE SECRETIONS
COMMENTS: 3+ MGD WITH TOOTHPASTE SECRETIONS

## 2024-07-08 ASSESSMENT — EXTERNAL EXAM - RIGHT EYE: OD_EXAM: SKIN  - ROSACEA

## 2024-07-08 ASSESSMENT — ENCOUNTER SYMPTOMS
MUSCULOSKELETAL NEGATIVE: 0
HEMATOLOGIC/LYMPHATIC NEGATIVE: 0
CARDIOVASCULAR NEGATIVE: 0
CONSTITUTIONAL NEGATIVE: 0
NEUROLOGICAL NEGATIVE: 0
GASTROINTESTINAL NEGATIVE: 0
EYES NEGATIVE: 1
ENDOCRINE NEGATIVE: 0
RESPIRATORY NEGATIVE: 0
PSYCHIATRIC NEGATIVE: 0
ALLERGIC/IMMUNOLOGIC NEGATIVE: 0

## 2024-07-08 ASSESSMENT — EXTERNAL EXAM - LEFT EYE: OS_EXAM: SKIN  - ROSACEA

## 2024-07-08 NOTE — PROGRESS NOTES
"Subjective   Patient ID: Stacie Richmond is a 63 y.o. female.    Chief Complaint    Follow-up         HPI    63 year old female present for Severe non-proliferative diabetic retinopathy (NPDR) with macular edema right eye PDR left eye, pt states that all symptoms are the same.     Last edited by Marley Pereira on 7/8/2024 10:49 AM.          No current outpatient medications on file. (Ophthalmology pharm classes)       Current Outpatient Medications (Other)   Medication Sig Dispense Refill    alcohol swabs pads, medicated Apply 1 each topically 4 times a day. Use prior to checking glucose or injecting insulin 400 each 3    blood sugar diagnostic (Blood Glucose Test) strip 1 each 4 times a day. Use to check glucose 4 times daily, before meals and at bedtime 400 strip 3    blood-glucose meter,continuous (Dexcom G7 ) misc Use as instructed 1 each 0    blood-glucose sensor (Dexcom G7 Sensor) device Use to check glucose, change every 10 days 3 each 11    ergocalciferol (Vitamin D-2) 1.25 MG (52581 UT) capsule Take by mouth.      insulin aspart, with niacinamide, (Fiasp FlexTouch U-100 Insulin) 100 unit/mL (3 mL) injection Inject 8 Units under the skin 3 times a day with meals. 21.6 mL 3    lancets misc 1 each 4 times a day. Use to check glucose 4 times daily, before meals and at bedtime 400 each 3    lisinopril 10 mg tablet Take 1 tablet (10 mg) by mouth once daily. 90 tablet 3    metFORMIN (Glucophage) 1,000 mg tablet Take 1 tablet (1,000 mg) by mouth 2 times a day. 180 tablet 3    pen needle, diabetic 32 gauge x 5/32\" needle 1 each 4 times a day. Use to inject insulin 4 times daily 400 each 3    semaglutide (Ozempic) 2 mg/dose (8 mg/3 mL) pen injector Inject 2 mg under the skin 1 (one) time per week. 9 mL 3    Tresiba FlexTouch U-100 100 unit/mL (3 mL) injection Inject 25 Units under the skin once daily in the morning. 22.5 mL 3       Objective   Base Eye Exam       Visual Acuity (Snellen - Linear)         " Right Left    Dist sc 20/30 -2 20/40    Dist ph sc  NI              Tonometry (Goldmann Applanation, 10:54 AM)         Right Left    Pressure 12 11              Visual Fields         Left Right     Full Full              Extraocular Movement         Right Left     Full Full              Neuro/Psych       Oriented x3: Yes    Mood/Affect: Normal              Dilation       Both eyes: 1% Mydriacyl & 2.5% Rickie  @ 10:54 AM                  Slit Lamp and Fundus Exam       External Exam         Right Left    External Skin  - Rosacea Skin  - Rosacea              Slit Lamp Exam         Right Left    Lids/Lashes 3+ MGD with toothpaste secretions 3+ MGD with toothpaste secretions    Conjunctiva/Sclera White and quiet. Pingueculae nasally and temporallyno reaction on eversion mild injection 360 with temp pingueculae    Cornea 1+ inf PEE, Endothelial pigmentEarly band keratopathy temporally 1+ inf PEE, Endothelial pigmentEarly band keratopathy temporally    Anterior Chamber Deep and quiet Deep and quiet    Iris Round and reactive, no NVI Round and reactive, no NVI    Lens 2+ Nuclear Sclerosis 2+ Nuclear Sclerosis    Anterior Vitreous Clear hemorrhage              Fundus Exam         Right Left    Disc Pink and sharp, no NVD Pink and sharp, no NVD    C/D Ratio .4 .4    Macula DBH, MAs, exudate DBH, MAs, exudate    Vessels Normal Normal    Periphery extensive DBH extensive DBH, suspected NVE                  Imaging    Macula OCT 07/08/24  Right eye (OD): Edema with IRF outside of fovea, preserved foveal contour, mild DRIL  Left eye (OS): Edema with IRF outside of fovea, preserved foveal contour, mild DRIL    Assessment/Plan     Last seen by me 6/24/24    #Severe non-proliferative diabetic retinopathy (NPDR) with macular edema right eye  #PDR left eye   #Vitreous hemorrhage left eye  -Still very blurry left eye, continued floaters left eye, no pain, no other changes in symptoms  -Discussion regarding options of close observation,  anti-VEGF, and/or pars plana vitrectomy (PPV) with endolaser (EL) . Still with enough peripheral blood to make panretinal photocoagulation (PRP) difficult today. Options include observation with or without antiVEGF to see if VH improves enough for in office panretinal photocoagulation (PRP), versus vitrectomy with panretinal photocoagulation (PRP) left eye. -Patient wants to consider further, will conitnue to closely monitor, f/up 2 weeks. If VH worsens, would likely need pars plana vitrectomy (PPV)/endolaser (EL)  -Once get left eye stablizied would want OPTOS fluorescein angiography (FA) to assess for signs of subtle neovascularization elsewhere (NVE) right eye   -Follow up 2 weeks sooner PRN Naomy or Ventura for possible panretinal photocoagulation (PRP) left eye

## 2024-07-19 ENCOUNTER — TELEPHONE (OUTPATIENT)
Dept: OPHTHALMOLOGY | Facility: CLINIC | Age: 64
End: 2024-07-19
Payer: COMMERCIAL

## 2024-07-19 NOTE — TELEPHONE ENCOUNTER
The patient's daughter called in this morning regarding the patient's plan of care. I explained that the patient has vitreous hemorrhage in the left eye due to proliferative diabetic retinopathy and that we are planning to perform panretinal photocoagulation (PRP)  at the next visit 8/5/24 if the hemorrhage has improved. If the hemorrhage is unchanged or has worsened then we would likely need to proceed with surgery which would include pars plana vitrectomy/endolaser. The patient's daugther voiced understanding of the plan and all questions were answered.

## 2024-07-22 ENCOUNTER — APPOINTMENT (OUTPATIENT)
Dept: DERMATOLOGY | Facility: CLINIC | Age: 64
End: 2024-07-22
Payer: COMMERCIAL

## 2024-07-22 DIAGNOSIS — L71.9 ROSACEA: Primary | ICD-10-CM

## 2024-07-22 PROCEDURE — 4010F ACE/ARB THERAPY RXD/TAKEN: CPT | Performed by: STUDENT IN AN ORGANIZED HEALTH CARE EDUCATION/TRAINING PROGRAM

## 2024-07-22 PROCEDURE — 3060F POS MICROALBUMINURIA REV: CPT | Performed by: STUDENT IN AN ORGANIZED HEALTH CARE EDUCATION/TRAINING PROGRAM

## 2024-07-22 PROCEDURE — 3048F LDL-C <100 MG/DL: CPT | Performed by: STUDENT IN AN ORGANIZED HEALTH CARE EDUCATION/TRAINING PROGRAM

## 2024-07-22 PROCEDURE — 3046F HEMOGLOBIN A1C LEVEL >9.0%: CPT | Performed by: STUDENT IN AN ORGANIZED HEALTH CARE EDUCATION/TRAINING PROGRAM

## 2024-07-22 PROCEDURE — 99214 OFFICE O/P EST MOD 30 MIN: CPT | Performed by: STUDENT IN AN ORGANIZED HEALTH CARE EDUCATION/TRAINING PROGRAM

## 2024-07-22 RX ORDER — METRONIDAZOLE 7.5 MG/G
1 CREAM TOPICAL DAILY
Qty: 45 G | Refills: 11 | Status: SHIPPED | OUTPATIENT
Start: 2024-07-22

## 2024-07-22 RX ORDER — MINOCYCLINE HYDROCHLORIDE 100 MG/1
100 CAPSULE ORAL DAILY
Qty: 90 CAPSULE | Refills: 0 | Status: SHIPPED | OUTPATIENT
Start: 2024-07-22 | End: 2024-10-20

## 2024-07-22 NOTE — PROGRESS NOTES
Subjective     Stacie Richmond is a 63 y.o. female who presents for the following: Acne (LV 2/19/22. Dr. Downey. Rx Minocycline. Patient has no concerns today. Would like refill on medication.).     Review of Systems:  No other skin or systemic complaints other than what is documented elsewhere in the note.    The following portions of the chart were reviewed this encounter and updated as appropriate:          Skin Cancer History  No skin cancer on file.      Specialty Problems    None       Objective   Well appearing patient in no apparent distress; mood and affect are within normal limits.    A focused skin examination was performed. All findings within normal limits unless otherwise noted below.    Assessment/Plan   1. Rosacea  Nose  Erythema and few pustules  She has been on minocycline since 2020  Flares when she is not on it, but doesn't take it consistently every day  Discussed that long term antibiotic therapy can have many adverse effects and is not advised for longer than 3 months at a time  Will continue for three more months, discussed r/b/se of treatment  Start metrocream 0.75% daily during this time. Will continue with this topical treatment once her prescription of oral antibiotics is finished     metroNIDAZOLE (Metrocream) 0.75 % cream - Nose  Apply 1 Application topically once daily.    minocycline 100 mg capsule - Nose  Take 1 capsule (100 mg) by mouth once daily.

## 2024-08-05 ENCOUNTER — APPOINTMENT (OUTPATIENT)
Dept: OPHTHALMOLOGY | Facility: CLINIC | Age: 64
End: 2024-08-05
Payer: COMMERCIAL

## 2024-08-05 DIAGNOSIS — E11.3512 PROLIFERATIVE DIABETIC RETINOPATHY OF LEFT EYE WITH MACULAR EDEMA ASSOCIATED WITH TYPE 2 DIABETES MELLITUS (MULTI): Primary | ICD-10-CM

## 2024-08-05 DIAGNOSIS — E11.3299 NON-PROLIFERATIVE DIABETIC RETINOPATHY (MULTI): ICD-10-CM

## 2024-08-05 PROCEDURE — 67228 TREATMENT X10SV RETINOPATHY: CPT | Mod: LEFT SIDE | Performed by: OPHTHALMOLOGY

## 2024-08-05 PROCEDURE — 92134 CPTRZ OPH DX IMG PST SGM RTA: CPT | Performed by: OPHTHALMOLOGY

## 2024-08-05 PROCEDURE — 99213 OFFICE O/P EST LOW 20 MIN: CPT | Performed by: OPHTHALMOLOGY

## 2024-08-05 ASSESSMENT — CONF VISUAL FIELD
OS_INFERIOR_TEMPORAL_RESTRICTION: 0
OD_SUPERIOR_TEMPORAL_RESTRICTION: 0
OD_SUPERIOR_NASAL_RESTRICTION: 0
OD_INFERIOR_TEMPORAL_RESTRICTION: 0
OD_NORMAL: 1
OS_SUPERIOR_NASAL_RESTRICTION: 0
OS_NORMAL: 1
OS_SUPERIOR_TEMPORAL_RESTRICTION: 0
OS_INFERIOR_NASAL_RESTRICTION: 0
OD_INFERIOR_NASAL_RESTRICTION: 0

## 2024-08-05 ASSESSMENT — CUP TO DISC RATIO
OD_RATIO: .4
OS_RATIO: .4

## 2024-08-05 ASSESSMENT — PACHYMETRY
OD_CT(UM): 13
OS_CT(UM): 13

## 2024-08-05 ASSESSMENT — VISUAL ACUITY
OD_SC: 20/25
METHOD: SNELLEN - LINEAR
OS_SC: 20/30

## 2024-08-05 ASSESSMENT — SLIT LAMP EXAM - LIDS
COMMENTS: 3+ MGD WITH TOOTHPASTE SECRETIONS
COMMENTS: 3+ MGD WITH TOOTHPASTE SECRETIONS

## 2024-08-05 ASSESSMENT — EXTERNAL EXAM - RIGHT EYE: OD_EXAM: SKIN  - ROSACEA

## 2024-08-05 ASSESSMENT — EXTERNAL EXAM - LEFT EYE: OS_EXAM: SKIN  - ROSACEA

## 2024-08-05 NOTE — PROGRESS NOTES
"Subjective   Patient ID: Stacie Richmond is a 63 y.o. female.    Chief Complaint    Diabetic Retinopathy       No current outpatient medications on file. (Ophthalmology pharm classes)       Current Outpatient Medications (Other)   Medication Sig Dispense Refill    alcohol swabs pads, medicated Apply 1 each topically 4 times a day. Use prior to checking glucose or injecting insulin 400 each 3    blood sugar diagnostic (Blood Glucose Test) strip 1 each 4 times a day. Use to check glucose 4 times daily, before meals and at bedtime 400 strip 3    blood-glucose meter,continuous (Dexcom G7 ) misc Use as instructed 1 each 0    blood-glucose sensor (Dexcom G7 Sensor) device Use to check glucose, change every 10 days 3 each 11    ergocalciferol (Vitamin D-2) 1.25 MG (02707 UT) capsule Take by mouth.      insulin aspart, with niacinamide, (Fiasp FlexTouch U-100 Insulin) 100 unit/mL (3 mL) injection Inject 8 Units under the skin 3 times a day with meals. 21.6 mL 3    lancets misc 1 each 4 times a day. Use to check glucose 4 times daily, before meals and at bedtime 400 each 3    lisinopril 10 mg tablet Take 1 tablet (10 mg) by mouth once daily. 90 tablet 3    metFORMIN (Glucophage) 1,000 mg tablet Take 1 tablet (1,000 mg) by mouth 2 times a day. 180 tablet 3    metroNIDAZOLE (Metrocream) 0.75 % cream Apply 1 Application topically once daily. 45 g 11    minocycline 100 mg capsule Take 1 capsule (100 mg) by mouth once daily. 90 capsule 0    pen needle, diabetic 32 gauge x 5/32\" needle 1 each 4 times a day. Use to inject insulin 4 times daily 400 each 3    semaglutide (Ozempic) 2 mg/dose (8 mg/3 mL) pen injector Inject 2 mg under the skin 1 (one) time per week. 9 mL 3    Tresiba FlexTouch U-100 100 unit/mL (3 mL) injection Inject 25 Units under the skin once daily in the morning. 22.5 mL 3       Objective   Base Eye Exam       Visual Acuity (Snellen - Linear)         Right Left    Dist sc 20/25 20/30              " Pachymetry (8/5/2024)         Right Left    Thickness 13 13              Pupils         Pupils    Right PERRL, No APD    Left PERRL, No APD              Visual Fields         Left Right     Full Full              Extraocular Movement         Right Left     Full Full              Neuro/Psych       Oriented x3: Yes    Mood/Affect: Normal              Dilation       Both eyes: 1% Mydriacyl & 2.5% Rickie  @ 10:09 AM                  Slit Lamp and Fundus Exam       External Exam         Right Left    External Skin  - Rosacea Skin  - Rosacea              Slit Lamp Exam         Right Left    Lids/Lashes 3+ MGD with toothpaste secretions 3+ MGD with toothpaste secretions    Conjunctiva/Sclera White and quiet. Pingueculae nasally and temporallyno reaction on eversion mild injection 360 with temp pingueculae    Cornea 1+ inf PEE, Endothelial pigmentEarly band keratopathy temporally 1+ inf PEE, Endothelial pigmentEarly band keratopathy temporally    Anterior Chamber Deep and quiet Deep and quiet    Iris Round and reactive, no NVI Round and reactive, no NVI    Lens 2+ Nuclear Sclerosis 2+ Nuclear Sclerosis    Anterior Vitreous Clear hemorrhage              Fundus Exam         Right Left    Disc Pink and sharp, no NVD Pink and sharp, no NVD    C/D Ratio .4 .4    Macula DBH, MAs, exudate DBH, MAs, exudate    Vessels Normal Normal    Periphery extensive DBH extensive DBH, suspected NVE                  Imaging    Macula OCT 07/08/24  Right eye (OD): Edema with IRF outside of fovea, preserved foveal contour, mild DRIL  Left eye (OS): Edema with IRF outside of fovea, preserved foveal contour, mild DRIL    Assessment/Plan     Last seen by me 7/19/24    #Severe non-proliferative diabetic retinopathy (NPDR) with macular edema right eye  #PDR left eye   #Vitreous hemorrhage left eye    Vision stable 20/30, VH has improved and view to retina is adequate for PRP   PRP#1 done today - tolerated well    RTC 2-3 weeks for follow up and fill in  PRP     PRIOR COURSE:     -Discussion regarding options of close observation, anti-VEGF, and/or pars plana vitrectomy (PPV) with endolaser (EL) . Still with enough peripheral blood to make panretinal photocoagulation (PRP) difficult today. Options include observation with or without antiVEGF to see if VH improves enough for in office panretinal photocoagulation (PRP), versus vitrectomy with panretinal photocoagulation (PRP) left eye. -Patient wants to consider further, will conitnue to closely monitor, f/up 2 weeks. If VH worsens, would likely need pars plana vitrectomy (PPV)/endolaser (EL)  -Once get left eye stablizied would want OPTOS fluorescein angiography (FA) to assess for signs of subtle neovascularization elsewhere (NVE) right eye     -Follow up 2 weeks sooner PRN [place] or [place] for possible panretinal photocoagulation (PRP) left eye

## 2024-08-12 ENCOUNTER — APPOINTMENT (OUTPATIENT)
Dept: OPHTHALMOLOGY | Facility: CLINIC | Age: 64
End: 2024-08-12
Payer: COMMERCIAL

## 2024-08-18 NOTE — PROGRESS NOTES
"Subjective   Patient ID: Stacie Richmond is a 63 y.o. female.        No current outpatient medications on file. (Ophthalmology pharm classes)       Current Outpatient Medications (Other)   Medication Sig Dispense Refill    alcohol swabs pads, medicated Apply 1 each topically 4 times a day. Use prior to checking glucose or injecting insulin 400 each 3    blood sugar diagnostic (Blood Glucose Test) strip 1 each 4 times a day. Use to check glucose 4 times daily, before meals and at bedtime 400 strip 3    blood-glucose meter,continuous (Dexcom G7 ) misc Use as instructed 1 each 0    blood-glucose sensor (Dexcom G7 Sensor) device Use to check glucose, change every 10 days 3 each 11    ergocalciferol (Vitamin D-2) 1.25 MG (81950 UT) capsule Take by mouth.      insulin aspart, with niacinamide, (Fiasp FlexTouch U-100 Insulin) 100 unit/mL (3 mL) injection Inject 8 Units under the skin 3 times a day with meals. 21.6 mL 3    lancets misc 1 each 4 times a day. Use to check glucose 4 times daily, before meals and at bedtime 400 each 3    lisinopril 10 mg tablet Take 1 tablet (10 mg) by mouth once daily. 90 tablet 3    metFORMIN (Glucophage) 1,000 mg tablet Take 1 tablet (1,000 mg) by mouth 2 times a day. 180 tablet 3    metroNIDAZOLE (Metrocream) 0.75 % cream Apply 1 Application topically once daily. 45 g 11    minocycline 100 mg capsule Take 1 capsule (100 mg) by mouth once daily. 90 capsule 0    pen needle, diabetic 32 gauge x 5/32\" needle 1 each 4 times a day. Use to inject insulin 4 times daily 400 each 3    semaglutide (Ozempic) 2 mg/dose (8 mg/3 mL) pen injector Inject 2 mg under the skin 1 (one) time per week. 9 mL 3    Tresiba FlexTouch U-100 100 unit/mL (3 mL) injection Inject 25 Units under the skin once daily in the morning. 22.5 mL 3       Objective   Not recorded       Imaging    Macula OCT 08/19/24  Right eye (OD): Edema with IRF outside of fovea, preserved foveal contour, mild DRIL  Left eye (OS): Edema " with IRF outside of fovea, preserved foveal contour, mild DRIL    Assessment/Plan     Last seen by me 8/5/24      #Severe non-proliferative diabetic retinopathy (NPDR) with macular edema right eye  #PDR left eye   #Vitreous hemorrhage left eye    -VH has been resolving left eye  -Status post (s/p) panretinal photocoagulation (PRP) #1 (1069 shots) last visit 8/5/24  -Subjectively vision slightly improved, floaters improving  -No pain  -No changes right eye  -Overall health stable  -Good laser takes left eye, VH continues to resolve  -Right eye with at least severe non-proliferative diabetic retinopathy (NPDR) - no NVI/neovascularization of the disc (NVD), but possible subtle neovascularization elsewhere (NVE)?  -Will plan for 2-4 weeks [facility] for OPTOS fluorescein angiography (FA) both eyes - will assess if any remaining neovascularization elsewhere (NVE)/activity left eye needing fill-in panretinal photocoagulation (PRP), as well as signs of PDR right eye

## 2024-08-19 ENCOUNTER — APPOINTMENT (OUTPATIENT)
Dept: OPHTHALMOLOGY | Facility: CLINIC | Age: 64
End: 2024-08-19
Payer: COMMERCIAL

## 2024-08-19 DIAGNOSIS — E11.65 TYPE 2 DIABETES MELLITUS WITH HYPERGLYCEMIA, WITH LONG-TERM CURRENT USE OF INSULIN (MULTI): Primary | ICD-10-CM

## 2024-08-19 DIAGNOSIS — Z79.4 TYPE 2 DIABETES MELLITUS WITH HYPERGLYCEMIA, WITH LONG-TERM CURRENT USE OF INSULIN (MULTI): Primary | ICD-10-CM

## 2024-08-19 DIAGNOSIS — E11.3411: ICD-10-CM

## 2024-08-19 DIAGNOSIS — E11.3512 PROLIFERATIVE DIABETIC RETINOPATHY OF LEFT EYE WITH MACULAR EDEMA ASSOCIATED WITH TYPE 2 DIABETES MELLITUS (MULTI): ICD-10-CM

## 2024-08-19 PROCEDURE — 99213 OFFICE O/P EST LOW 20 MIN: CPT | Performed by: OPHTHALMOLOGY

## 2024-08-19 PROCEDURE — 92134 CPTRZ OPH DX IMG PST SGM RTA: CPT | Performed by: OPHTHALMOLOGY

## 2024-08-19 ASSESSMENT — VISUAL ACUITY
OS_SC: 20/40
OD_SC: 20/30
OS_SC+: +1
METHOD: SNELLEN - LINEAR
OD_SC+: +2

## 2024-08-19 ASSESSMENT — TONOMETRY
OS_IOP_MMHG: 14
IOP_METHOD: GOLDMANN APPLANATION
OD_IOP_MMHG: 18

## 2024-08-19 ASSESSMENT — ENCOUNTER SYMPTOMS
ENDOCRINE NEGATIVE: 0
ALLERGIC/IMMUNOLOGIC NEGATIVE: 0
NEUROLOGICAL NEGATIVE: 0
GASTROINTESTINAL NEGATIVE: 0
MUSCULOSKELETAL NEGATIVE: 0
EYES NEGATIVE: 1
RESPIRATORY NEGATIVE: 0
PSYCHIATRIC NEGATIVE: 0
CONSTITUTIONAL NEGATIVE: 0
CARDIOVASCULAR NEGATIVE: 0
HEMATOLOGIC/LYMPHATIC NEGATIVE: 0

## 2024-08-19 ASSESSMENT — PACHYMETRY
OS_CT(UM): 13
OD_CT(UM): 13

## 2024-08-19 ASSESSMENT — CONF VISUAL FIELD
OS_NORMAL: 1
OS_INFERIOR_NASAL_RESTRICTION: 0
OS_SUPERIOR_TEMPORAL_RESTRICTION: 0
OD_INFERIOR_NASAL_RESTRICTION: 0
OS_INFERIOR_TEMPORAL_RESTRICTION: 0
OD_SUPERIOR_NASAL_RESTRICTION: 0
OS_SUPERIOR_NASAL_RESTRICTION: 0
OD_SUPERIOR_TEMPORAL_RESTRICTION: 0
OD_NORMAL: 1
OD_INFERIOR_TEMPORAL_RESTRICTION: 0

## 2024-08-19 ASSESSMENT — EXTERNAL EXAM - RIGHT EYE: OD_EXAM: SKIN  - ROSACEA

## 2024-08-19 ASSESSMENT — SLIT LAMP EXAM - LIDS
COMMENTS: 3+ MGD WITH TOOTHPASTE SECRETIONS
COMMENTS: 3+ MGD WITH TOOTHPASTE SECRETIONS

## 2024-08-19 ASSESSMENT — EXTERNAL EXAM - LEFT EYE: OS_EXAM: SKIN  - ROSACEA

## 2024-08-19 ASSESSMENT — CUP TO DISC RATIO
OD_RATIO: .4
OS_RATIO: .4

## 2024-09-12 ENCOUNTER — APPOINTMENT (OUTPATIENT)
Dept: OPHTHALMOLOGY | Facility: CLINIC | Age: 64
End: 2024-09-12
Payer: COMMERCIAL

## 2024-09-17 DIAGNOSIS — L71.9 ROSACEA: ICD-10-CM

## 2024-09-17 RX ORDER — MINOCYCLINE HYDROCHLORIDE 100 MG/1
100 CAPSULE ORAL DAILY
Qty: 90 CAPSULE | Refills: 0 | OUTPATIENT
Start: 2024-09-17

## 2024-09-19 ENCOUNTER — OFFICE VISIT (OUTPATIENT)
Dept: OPHTHALMOLOGY | Facility: CLINIC | Age: 64
End: 2024-09-19
Payer: COMMERCIAL

## 2024-09-19 DIAGNOSIS — E11.3513 PROLIFERATIVE DIABETIC RETINOPATHY OF BOTH EYES WITH MACULAR EDEMA ASSOCIATED WITH TYPE 2 DIABETES MELLITUS: Primary | ICD-10-CM

## 2024-09-19 DIAGNOSIS — H43.13 VITREOUS HEMORRHAGE OF BOTH EYES (MULTI): ICD-10-CM

## 2024-09-19 PROCEDURE — 92134 CPTRZ OPH DX IMG PST SGM RTA: CPT | Performed by: OPHTHALMOLOGY

## 2024-09-19 PROCEDURE — 99213 OFFICE O/P EST LOW 20 MIN: CPT | Performed by: OPHTHALMOLOGY

## 2024-09-19 PROCEDURE — 92235 FLUORESCEIN ANGRPH MLTIFRAME: CPT | Mod: BILATERAL PROCEDURE | Performed by: OPHTHALMOLOGY

## 2024-09-19 ASSESSMENT — ENCOUNTER SYMPTOMS
PSYCHIATRIC NEGATIVE: 0
CONSTITUTIONAL NEGATIVE: 0
RESPIRATORY NEGATIVE: 0
ALLERGIC/IMMUNOLOGIC NEGATIVE: 0
ENDOCRINE NEGATIVE: 0
HEMATOLOGIC/LYMPHATIC NEGATIVE: 0
CARDIOVASCULAR NEGATIVE: 0
GASTROINTESTINAL NEGATIVE: 0
NEUROLOGICAL NEGATIVE: 0
EYES NEGATIVE: 0
MUSCULOSKELETAL NEGATIVE: 0

## 2024-09-19 ASSESSMENT — SLIT LAMP EXAM - LIDS
COMMENTS: 3+ MGD WITH TOOTHPASTE SECRETIONS
COMMENTS: 3+ MGD WITH TOOTHPASTE SECRETIONS

## 2024-09-19 ASSESSMENT — CUP TO DISC RATIO
OD_RATIO: .4
OS_RATIO: .4

## 2024-09-19 ASSESSMENT — EXTERNAL EXAM - LEFT EYE: OS_EXAM: SKIN  - ROSACEA

## 2024-09-19 ASSESSMENT — CONF VISUAL FIELD
OD_SUPERIOR_TEMPORAL_RESTRICTION: 3
OD_SUPERIOR_NASAL_RESTRICTION: 3
OD_INFERIOR_NASAL_RESTRICTION: 3
OD_INFERIOR_TEMPORAL_RESTRICTION: 3

## 2024-09-19 ASSESSMENT — TONOMETRY
OD_IOP_MMHG: 16
OS_IOP_MMHG: 14
IOP_METHOD: GOLDMANN APPLANATION

## 2024-09-19 ASSESSMENT — VISUAL ACUITY
OD_SC: CF @ 3 FT
METHOD: SNELLEN - LINEAR

## 2024-09-19 ASSESSMENT — EXTERNAL EXAM - RIGHT EYE: OD_EXAM: SKIN  - ROSACEA

## 2024-09-19 NOTE — PROGRESS NOTES
"Subjective   Patient ID: Stacie Richmond is a 63 y.o. female.    Chief Complaint    Follow-up         No current outpatient medications on file. (Ophthalmology pharm classes)       Current Outpatient Medications (Other)   Medication Sig Dispense Refill    alcohol swabs pads, medicated Apply 1 each topically 4 times a day. Use prior to checking glucose or injecting insulin 400 each 3    blood sugar diagnostic (Blood Glucose Test) strip 1 each 4 times a day. Use to check glucose 4 times daily, before meals and at bedtime 400 strip 3    blood-glucose meter,continuous (Dexcom G7 ) misc Use as instructed 1 each 0    blood-glucose sensor (Dexcom G7 Sensor) device Use to check glucose, change every 10 days 3 each 11    ergocalciferol (Vitamin D-2) 1.25 MG (55548 UT) capsule Take by mouth.      insulin aspart, with niacinamide, (Fiasp FlexTouch U-100 Insulin) 100 unit/mL (3 mL) injection Inject 8 Units under the skin 3 times a day with meals. 21.6 mL 3    lancets misc 1 each 4 times a day. Use to check glucose 4 times daily, before meals and at bedtime 400 each 3    lisinopril 10 mg tablet Take 1 tablet (10 mg) by mouth once daily. 90 tablet 3    metFORMIN (Glucophage) 1,000 mg tablet Take 1 tablet (1,000 mg) by mouth 2 times a day. 180 tablet 3    metroNIDAZOLE (Metrocream) 0.75 % cream Apply 1 Application topically once daily. 45 g 11    minocycline 100 mg capsule Take 1 capsule (100 mg) by mouth once daily. 90 capsule 0    pen needle, diabetic 32 gauge x 5/32\" needle 1 each 4 times a day. Use to inject insulin 4 times daily 400 each 3    semaglutide (Ozempic) 2 mg/dose (8 mg/3 mL) pen injector Inject 2 mg under the skin 1 (one) time per week. 9 mL 3    Tresiba FlexTouch U-100 100 unit/mL (3 mL) injection Inject 25 Units under the skin once daily in the morning. 22.5 mL 3       Objective   Base Eye Exam       Visual Acuity (Snellen - Linear)         Right Left    Dist sc CF @ 3 ft 20'40    Dist ph sc NI          "      Tonometry (Goldmann Applanation, 2:10 PM)         Right Left    Pressure 16 14              Pachymetry (8/5/2024)         Right Left    Thickness 13 13              Pupils         Pupils    Right PERRL, No APD    Left PERRL, No APD              Visual Fields         Left Right                                Extraocular Movement         Right Left     Full, Ortho Full, Ortho              Neuro/Psych       Oriented x3: Yes    Mood/Affect: Normal              Dilation       Both eyes: 1% Mydriacyl & 2.5% Rickie  @ 2:10 PM                  Slit Lamp and Fundus Exam       External Exam         Right Left    External Skin  - Rosacea Skin  - Rosacea              Slit Lamp Exam         Right Left    Lids/Lashes 3+ MGD with toothpaste secretions 3+ MGD with toothpaste secretions    Conjunctiva/Sclera White and quiet. Pingueculae nasally and temporallyno reaction on eversion White/quiet with temp pingueculae    Cornea 1+ inf PEE, Endothelial pigmentEarly band keratopathy temporally 1+ inf PEE, Endothelial pigmentEarly band keratopathy temporally    Anterior Chamber Deep and quiet Deep and quiet    Iris Round and reactive, no NVI Round and reactive, no NVI    Lens 2+ Nuclear Sclerosis 2+ Nuclear Sclerosis    Anterior Vitreous dense VH trace residual VH              Fundus Exam         Right Left    Disc poor view Pink and sharp, no NVD    C/D Ratio .4 .4    Macula poor view DBH, MAs, exudate    Vessels poor view Normal    Periphery poor view, but appear attached extensive DBH, suspected neovascularization elsewhere (NVE), 360 scatter laser scars                  Imaging    Macula OCT 09/19/24  Right eye (OD): Unable  Left eye (OS): Edema with IRF outside of fovea, preserved foveal contour, mild DRIL    Assessment/Plan     Last seen by me 8/19/24    #PDR both eyes  #Vitreous hemorrhage both eyes (resolving left eye, new right eye today)  -VH has been resolving left eye  -Status post (s/p) panretinal photocoagulation (PRP)  left eye #1 (1069 shots) last visit 8/5/24  -left eye Subjectively vision slightly improved, floaters improving  -Plan today was for OPTOS fluorescein angiography (FA) to assess response to panretinal photocoagulation (PRP) left eye, as well as check for neovascularization elsewhere (NVE) right eye given highly suspicious appearance  -Had sudden onset of decreased vision right eye this past Monday, secondary to new VH right eye   -OPTOS fluorescein angiography (FA) right eye limited by VH but does show significant ischemia as well as areas of leakage consistent with neovascularization elsewhere (NVE)  -fluorescein angiography (FA) left eye with what appears to be resolving neovascularization elsewhere (NVE) but still with good room for fill in, will plan for likely fill in panretinal photocoagulation (PRP) left eye once right eye improved  -Follow up 1-2 weeks with me, if not improving would likely benefit from pars plana vitrectomy (PPV)/endolaser (EL) right eye

## 2024-09-19 NOTE — LETTER
September 30, 2024     Patient: Stacie Richmond   YOB: 1960   Date of Visit: 9/19/2024       To Whom It May Concern:    It is my medical opinion that Stacie Richmond must be off of work for a period of at least 9/24/24 - 10/14/24, due to medical and safety necessity, temporary limitations secondary to vision and surgery. Most likely return to work after 10/14/24, pending treatment and repeat evaluations.     If you have any questions or concerns, please don't hesitate to call.         Sincerely,        Jam Morales MD    CC: No Recipients

## 2024-09-20 RX ORDER — FLUORESCEIN 500 MG/ML
500 INJECTION INTRAVENOUS
Status: COMPLETED | OUTPATIENT
Start: 2024-09-19 | End: 2024-09-19

## 2024-09-20 ASSESSMENT — PACHYMETRY
OD_CT(UM): 13
OS_CT(UM): 13

## 2024-09-25 ENCOUNTER — OFFICE VISIT (OUTPATIENT)
Dept: OPHTHALMOLOGY | Facility: CLINIC | Age: 64
End: 2024-09-25
Payer: COMMERCIAL

## 2024-09-25 DIAGNOSIS — H43.13 VITREOUS HEMORRHAGE OF BOTH EYES (MULTI): Primary | ICD-10-CM

## 2024-09-25 PROCEDURE — 92134 CPTRZ OPH DX IMG PST SGM RTA: CPT | Performed by: OPHTHALMOLOGY

## 2024-09-25 PROCEDURE — 99213 OFFICE O/P EST LOW 20 MIN: CPT | Performed by: OPHTHALMOLOGY

## 2024-09-25 ASSESSMENT — EXTERNAL EXAM - RIGHT EYE: OD_EXAM: SKIN  - ROSACEA

## 2024-09-25 ASSESSMENT — CONF VISUAL FIELD
METHOD: COUNTING FINGERS
OS_SUPERIOR_TEMPORAL_RESTRICTION: 3
OD_INFERIOR_TEMPORAL_RESTRICTION: 3
OS_SUPERIOR_NASAL_RESTRICTION: 3
OS_INFERIOR_NASAL_RESTRICTION: 3
OD_INFERIOR_NASAL_RESTRICTION: 3
OS_INFERIOR_TEMPORAL_RESTRICTION: 3
OD_SUPERIOR_NASAL_RESTRICTION: 3
OD_SUPERIOR_TEMPORAL_RESTRICTION: 3

## 2024-09-25 ASSESSMENT — TONOMETRY
IOP_METHOD: TONOPEN
OS_IOP_MMHG: 13
OD_IOP_MMHG: 13

## 2024-09-25 ASSESSMENT — PACHYMETRY
OS_CT(UM): 13
OD_CT(UM): 13

## 2024-09-25 ASSESSMENT — EXTERNAL EXAM - LEFT EYE: OS_EXAM: SKIN  - ROSACEA

## 2024-09-25 ASSESSMENT — VISUAL ACUITY
OS_SC: CF AT 1 FT
METHOD: SNELLEN - LINEAR
OD_SC: CF AT 1 FT

## 2024-09-25 ASSESSMENT — SLIT LAMP EXAM - LIDS
COMMENTS: 3+ MGD WITH TOOTHPASTE SECRETIONS
COMMENTS: 3+ MGD WITH TOOTHPASTE SECRETIONS

## 2024-09-25 ASSESSMENT — CUP TO DISC RATIO: OD_RATIO: .4

## 2024-09-25 NOTE — PROGRESS NOTES
#PDR both eyes  #Vitreous hemorrhage both eyes  - Patient of Dr. Morales's, last saw him 9/19 with new VH right eye and upcoming scheduled appt 9/30 with plan for potential surgery right eye if VH not resolved at that visit  - Now coming in with one day decreased vision left eye (now CF vision both eyes) with new VH left eye  - No tears or detachments on B-scan both eyes, VH present in both eyes   - Encourage bed rest, elevate HOB, avoid bending, straining, heavy lifting  - Avoid blood thinning products (ASA, NSAIDs, etc) unless medically necessary  - Follow up as scheduled with Dr. Morales 9/30/24

## 2024-09-30 ENCOUNTER — APPOINTMENT (OUTPATIENT)
Dept: OPHTHALMOLOGY | Facility: CLINIC | Age: 64
End: 2024-09-30
Payer: COMMERCIAL

## 2024-09-30 ENCOUNTER — PREP FOR PROCEDURE (OUTPATIENT)
Dept: OPHTHALMOLOGY | Facility: CLINIC | Age: 64
End: 2024-09-30

## 2024-09-30 DIAGNOSIS — H43.11 VITREOUS HEMORRHAGE, RIGHT EYE (MULTI): Primary | ICD-10-CM

## 2024-09-30 RX ORDER — PHENYLEPHRINE HYDROCHLORIDE 25 MG/ML
1 SOLUTION/ DROPS OPHTHALMIC
Status: CANCELLED | OUTPATIENT
Start: 2024-09-30 | End: 2024-09-30

## 2024-09-30 RX ORDER — TROPICAMIDE 10 MG/ML
1 SOLUTION/ DROPS OPHTHALMIC
Status: CANCELLED | OUTPATIENT
Start: 2024-09-30 | End: 2024-09-30

## 2024-09-30 RX ORDER — SODIUM CHLORIDE, SODIUM LACTATE, POTASSIUM CHLORIDE, CALCIUM CHLORIDE 600; 310; 30; 20 MG/100ML; MG/100ML; MG/100ML; MG/100ML
20 INJECTION, SOLUTION INTRAVENOUS CONTINUOUS
Status: CANCELLED | OUTPATIENT
Start: 2024-09-30

## 2024-09-30 RX ORDER — PROPARACAINE HYDROCHLORIDE 5 MG/ML
1 SOLUTION/ DROPS OPHTHALMIC ONCE
Status: CANCELLED | OUTPATIENT
Start: 2024-09-30 | End: 2024-09-30

## 2024-10-01 PROBLEM — H43.11 VITREOUS HEMORRHAGE, RIGHT EYE (MULTI): Status: ACTIVE | Noted: 2024-09-30

## 2024-10-02 ENCOUNTER — APPOINTMENT (OUTPATIENT)
Dept: ENDOCRINOLOGY | Facility: CLINIC | Age: 64
End: 2024-10-02
Payer: COMMERCIAL

## 2024-10-09 ENCOUNTER — ANESTHESIA EVENT (OUTPATIENT)
Dept: OPERATING ROOM | Facility: CLINIC | Age: 64
End: 2024-10-09
Payer: COMMERCIAL

## 2024-10-10 ENCOUNTER — ANESTHESIA (OUTPATIENT)
Dept: OPERATING ROOM | Facility: CLINIC | Age: 64
End: 2024-10-10
Payer: COMMERCIAL

## 2024-10-10 ENCOUNTER — HOSPITAL ENCOUNTER (OUTPATIENT)
Facility: CLINIC | Age: 64
Setting detail: OUTPATIENT SURGERY
Discharge: HOME | End: 2024-10-10
Attending: OPHTHALMOLOGY | Admitting: OPHTHALMOLOGY
Payer: COMMERCIAL

## 2024-10-10 ENCOUNTER — APPOINTMENT (OUTPATIENT)
Dept: OPHTHALMOLOGY | Facility: CLINIC | Age: 64
End: 2024-10-10
Payer: COMMERCIAL

## 2024-10-10 VITALS
WEIGHT: 138.89 LBS | TEMPERATURE: 98.1 F | SYSTOLIC BLOOD PRESSURE: 145 MMHG | HEART RATE: 79 BPM | OXYGEN SATURATION: 96 % | BODY MASS INDEX: 28 KG/M2 | HEIGHT: 59 IN | RESPIRATION RATE: 16 BRPM | DIASTOLIC BLOOD PRESSURE: 75 MMHG

## 2024-10-10 DIAGNOSIS — H35.051: ICD-10-CM

## 2024-10-10 DIAGNOSIS — H43.11 VITREOUS HEMORRHAGE, RIGHT EYE (MULTI): Primary | ICD-10-CM

## 2024-10-10 DIAGNOSIS — E11.3591 PROLIFERATIVE DIABETIC RETINOPATHY OF RIGHT EYE ASSOCIATED WITH TYPE 2 DIABETES MELLITUS, MACULAR EDEMA PRESENCE UNSPECIFIED: ICD-10-CM

## 2024-10-10 PROBLEM — I10 HTN (HYPERTENSION): Status: ACTIVE | Noted: 2024-10-10

## 2024-10-10 LAB — GLUCOSE BLD MANUAL STRIP-MCNC: 331 MG/DL (ref 74–99)

## 2024-10-10 PROCEDURE — 82947 ASSAY GLUCOSE BLOOD QUANT: CPT

## 2024-10-10 PROCEDURE — 2500000004 HC RX 250 GENERAL PHARMACY W/ HCPCS (ALT 636 FOR OP/ED): Performed by: OPHTHALMOLOGY

## 2024-10-10 PROCEDURE — 7100000010 HC PHASE TWO TIME - EACH INCREMENTAL 1 MINUTE: Performed by: OPHTHALMOLOGY

## 2024-10-10 PROCEDURE — 3600000003 HC OR TIME - INITIAL BASE CHARGE - PROCEDURE LEVEL THREE: Performed by: OPHTHALMOLOGY

## 2024-10-10 PROCEDURE — 2500000001 HC RX 250 WO HCPCS SELF ADMINISTERED DRUGS (ALT 637 FOR MEDICARE OP): Performed by: OPHTHALMOLOGY

## 2024-10-10 PROCEDURE — 2720000007 HC OR 272 NO HCPCS: Performed by: OPHTHALMOLOGY

## 2024-10-10 PROCEDURE — 2500000005 HC RX 250 GENERAL PHARMACY W/O HCPCS: Performed by: OPHTHALMOLOGY

## 2024-10-10 PROCEDURE — 3600000008 HC OR TIME - EACH INCREMENTAL 1 MINUTE - PROCEDURE LEVEL THREE: Performed by: OPHTHALMOLOGY

## 2024-10-10 PROCEDURE — 67040 LASER TREATMENT OF RETINA: CPT | Performed by: OPHTHALMOLOGY

## 2024-10-10 PROCEDURE — 7100000009 HC PHASE TWO TIME - INITIAL BASE CHARGE: Performed by: OPHTHALMOLOGY

## 2024-10-10 PROCEDURE — 67040 LASER TREATMENT OF RETINA: CPT | Performed by: STUDENT IN AN ORGANIZED HEALTH CARE EDUCATION/TRAINING PROGRAM

## 2024-10-10 PROCEDURE — 3700000002 HC GENERAL ANESTHESIA TIME - EACH INCREMENTAL 1 MINUTE: Performed by: OPHTHALMOLOGY

## 2024-10-10 PROCEDURE — 2500000004 HC RX 250 GENERAL PHARMACY W/ HCPCS (ALT 636 FOR OP/ED): Performed by: ANESTHESIOLOGIST ASSISTANT

## 2024-10-10 PROCEDURE — 3700000001 HC GENERAL ANESTHESIA TIME - INITIAL BASE CHARGE: Performed by: OPHTHALMOLOGY

## 2024-10-10 RX ORDER — PREDNISOLONE ACETATE 10 MG/ML
1 SUSPENSION/ DROPS OPHTHALMIC 4 TIMES DAILY
Qty: 10 ML | Refills: 2 | Status: SHIPPED | OUTPATIENT
Start: 2024-10-10 | End: 2024-11-09

## 2024-10-10 RX ORDER — WATER 1 ML/ML
IRRIGANT IRRIGATION AS NEEDED
Status: DISCONTINUED | OUTPATIENT
Start: 2024-10-10 | End: 2024-10-10 | Stop reason: HOSPADM

## 2024-10-10 RX ORDER — MIDAZOLAM HYDROCHLORIDE 1 MG/ML
INJECTION, SOLUTION INTRAMUSCULAR; INTRAVENOUS AS NEEDED
Status: DISCONTINUED | OUTPATIENT
Start: 2024-10-10 | End: 2024-10-10

## 2024-10-10 RX ORDER — FENTANYL CITRATE 50 UG/ML
INJECTION, SOLUTION INTRAMUSCULAR; INTRAVENOUS AS NEEDED
Status: DISCONTINUED | OUTPATIENT
Start: 2024-10-10 | End: 2024-10-10

## 2024-10-10 RX ORDER — OFLOXACIN 3 MG/ML
1 SOLUTION/ DROPS OPHTHALMIC 4 TIMES DAILY
Qty: 5 ML | Refills: 1 | Status: SHIPPED | OUTPATIENT
Start: 2024-10-10 | End: 2024-10-17

## 2024-10-10 RX ORDER — SODIUM CHLORIDE, SODIUM LACTATE, POTASSIUM CHLORIDE, CALCIUM CHLORIDE 600; 310; 30; 20 MG/100ML; MG/100ML; MG/100ML; MG/100ML
20 INJECTION, SOLUTION INTRAVENOUS CONTINUOUS
Status: DISCONTINUED | OUTPATIENT
Start: 2024-10-10 | End: 2024-10-10 | Stop reason: HOSPADM

## 2024-10-10 RX ORDER — FENTANYL CITRATE 50 UG/ML
50 INJECTION, SOLUTION INTRAMUSCULAR; INTRAVENOUS EVERY 5 MIN PRN
Status: DISCONTINUED | OUTPATIENT
Start: 2024-10-10 | End: 2024-10-10 | Stop reason: HOSPADM

## 2024-10-10 RX ORDER — PHENYLEPHRINE HYDROCHLORIDE 25 MG/ML
1 SOLUTION/ DROPS OPHTHALMIC
Status: COMPLETED | OUTPATIENT
Start: 2024-10-10 | End: 2024-10-10

## 2024-10-10 RX ORDER — SODIUM CHLORIDE, SODIUM LACTATE, POTASSIUM CHLORIDE, CALCIUM CHLORIDE 600; 310; 30; 20 MG/100ML; MG/100ML; MG/100ML; MG/100ML
100 INJECTION, SOLUTION INTRAVENOUS CONTINUOUS
Status: DISCONTINUED | OUTPATIENT
Start: 2024-10-10 | End: 2024-10-10 | Stop reason: HOSPADM

## 2024-10-10 RX ORDER — PROPOFOL 10 MG/ML
INJECTION, EMULSION INTRAVENOUS AS NEEDED
Status: DISCONTINUED | OUTPATIENT
Start: 2024-10-10 | End: 2024-10-10

## 2024-10-10 RX ORDER — BUPIVACAINE HYDROCHLORIDE 7.5 MG/ML
INJECTION, SOLUTION EPIDURAL; RETROBULBAR AS NEEDED
Status: DISCONTINUED | OUTPATIENT
Start: 2024-10-10 | End: 2024-10-10 | Stop reason: HOSPADM

## 2024-10-10 RX ORDER — FENTANYL CITRATE 50 UG/ML
25 INJECTION, SOLUTION INTRAMUSCULAR; INTRAVENOUS EVERY 5 MIN PRN
Status: DISCONTINUED | OUTPATIENT
Start: 2024-10-10 | End: 2024-10-10 | Stop reason: HOSPADM

## 2024-10-10 RX ORDER — DROPERIDOL 2.5 MG/ML
0.62 INJECTION, SOLUTION INTRAMUSCULAR; INTRAVENOUS ONCE AS NEEDED
Status: DISCONTINUED | OUTPATIENT
Start: 2024-10-10 | End: 2024-10-10 | Stop reason: HOSPADM

## 2024-10-10 RX ORDER — ONDANSETRON HYDROCHLORIDE 2 MG/ML
4 INJECTION, SOLUTION INTRAVENOUS ONCE AS NEEDED
Status: DISCONTINUED | OUTPATIENT
Start: 2024-10-10 | End: 2024-10-10 | Stop reason: HOSPADM

## 2024-10-10 RX ORDER — CEFAZOLIN 1 G/1
INJECTION, POWDER, FOR SOLUTION INTRAVENOUS AS NEEDED
Status: DISCONTINUED | OUTPATIENT
Start: 2024-10-10 | End: 2024-10-10 | Stop reason: HOSPADM

## 2024-10-10 RX ORDER — TROPICAMIDE 10 MG/ML
1 SOLUTION/ DROPS OPHTHALMIC
Status: COMPLETED | OUTPATIENT
Start: 2024-10-10 | End: 2024-10-10

## 2024-10-10 RX ORDER — PROPARACAINE HYDROCHLORIDE 5 MG/ML
1 SOLUTION/ DROPS OPHTHALMIC ONCE
Status: COMPLETED | OUTPATIENT
Start: 2024-10-10 | End: 2024-10-10

## 2024-10-10 RX ORDER — TRIAMCINOLONE ACETONIDE 40 MG/ML
INJECTION, SUSPENSION INTRA-ARTICULAR; INTRAMUSCULAR AS NEEDED
Status: DISCONTINUED | OUTPATIENT
Start: 2024-10-10 | End: 2024-10-10 | Stop reason: HOSPADM

## 2024-10-10 RX ORDER — DEXAMETHASONE SODIUM PHOSPHATE 10 MG/ML
INJECTION INTRAMUSCULAR; INTRAVENOUS AS NEEDED
Status: DISCONTINUED | OUTPATIENT
Start: 2024-10-10 | End: 2024-10-10 | Stop reason: HOSPADM

## 2024-10-10 RX ADMIN — PHENYLEPHRINE HYDROCHLORIDE 1 DROP: 25 SOLUTION/ DROPS OPHTHALMIC at 08:45

## 2024-10-10 RX ADMIN — TROPICAMIDE 1 DROP: 10 SOLUTION/ DROPS OPHTHALMIC at 08:40

## 2024-10-10 RX ADMIN — TROPICAMIDE 1 DROP: 10 SOLUTION/ DROPS OPHTHALMIC at 08:35

## 2024-10-10 RX ADMIN — PHENYLEPHRINE HYDROCHLORIDE 1 DROP: 25 SOLUTION/ DROPS OPHTHALMIC at 08:35

## 2024-10-10 RX ADMIN — TROPICAMIDE 1 DROP: 10 SOLUTION/ DROPS OPHTHALMIC at 08:45

## 2024-10-10 RX ADMIN — PROPARACAINE HYDROCHLORIDE 1 DROP: 5 SOLUTION/ DROPS OPHTHALMIC at 08:35

## 2024-10-10 RX ADMIN — PHENYLEPHRINE HYDROCHLORIDE 1 DROP: 25 SOLUTION/ DROPS OPHTHALMIC at 08:40

## 2024-10-10 SDOH — HEALTH STABILITY: MENTAL HEALTH: CURRENT SMOKER: 0

## 2024-10-10 ASSESSMENT — PAIN SCALES - GENERAL
PAINLEVEL_OUTOF10: 0 - NO PAIN
PAINLEVEL_OUTOF10: 0 - NO PAIN
PAIN_LEVEL: 0
PAINLEVEL_OUTOF10: 0 - NO PAIN

## 2024-10-10 ASSESSMENT — COLUMBIA-SUICIDE SEVERITY RATING SCALE - C-SSRS
6. HAVE YOU EVER DONE ANYTHING, STARTED TO DO ANYTHING, OR PREPARED TO DO ANYTHING TO END YOUR LIFE?: NO
1. IN THE PAST MONTH, HAVE YOU WISHED YOU WERE DEAD OR WISHED YOU COULD GO TO SLEEP AND NOT WAKE UP?: NO
2. HAVE YOU ACTUALLY HAD ANY THOUGHTS OF KILLING YOURSELF?: NO

## 2024-10-10 ASSESSMENT — PAIN - FUNCTIONAL ASSESSMENT
PAIN_FUNCTIONAL_ASSESSMENT: 0-10

## 2024-10-10 NOTE — DISCHARGE INSTRUCTIONS
Please keep the eye patched/shielded until your post op day 1 appointment tomorrow.    Appointment: [facility], [facility], [address] (3rd Floor) - Please report to clinic tomorrow at 1PM - You will be seen by Thomas Alcala MD     Please do not perform any strenuous activity, bending below the waist, until you are cleared by your doctor.     Post Operative Medications: You will start your eye drops tomorrow after your post operative day 1 appointment   Antibiotic (Tan Cap) - 4 times per day              Steroid (Pink Cap) - 4 times per day   The order of drop instillation does not matter but you must wait atleast 5 minutes in between drops to ensure that the medications absorb properly

## 2024-10-10 NOTE — ANESTHESIA PREPROCEDURE EVALUATION
Patient: Stacie Richmond    Procedure Information       Anesthesia Start Date/Time: 10/10/24 0828    Procedure: Vitrectomy Pars Plana w/Endolaser (Right)    Location: [facility] / [facility]    Surgeons: Jam Morales MD            Relevant Problems   Anesthesia (within normal limits)      Cardiac   (+) HTN (hypertension)      Pulmonary (within normal limits)      Neuro (within normal limits)      GI (within normal limits)      /Renal (within normal limits)      Liver (within normal limits)      Endocrine   (+) Diabetes mellitus type 2 without retinopathy (Multi)   (+) NPDR (nonproliferative diabetic retinopathy) (Multi)   (+) Proliferative diabetic retinopathy of left eye associated with type 2 diabetes mellitus (Multi)   (+) Type 2 diabetes mellitus with hyperglycemia, with long-term current use of insulin      Hematology (within normal limits)      Musculoskeletal (within normal limits)       Clinical information reviewed:   Tobacco  Allergies  Meds   Med Hx  Surg Hx   Fam Hx  Soc Hx        NPO Detail:  No data recorded     Physical Exam    Airway  Mallampati: I  TM distance: >3 FB  Neck ROM: full     Cardiovascular - normal exam     Dental - normal exam     Pulmonary - normal exam     Abdominal - normal exam             Anesthesia Plan    History of general anesthesia?: yes  History of complications of general anesthesia?: no    ASA 3     MAC     The patient is not a current smoker.    intravenous induction   Anesthetic plan and risks discussed with patient.    Plan discussed with CAA and attending.

## 2024-10-10 NOTE — H&P
History Of Present Illness  Stacie Richmond is a 63 y.o. female presenting with right eye vitreuos hemorrhage      Past Medical History  Past Medical History:   Diagnosis Date    Band keratopathy of both eyes     Cataract     COVID-19     COVID-19    Diabetic retinopathy (Multi)     HL (hearing loss)     Personal history of other endocrine, nutritional and metabolic disease 2022    History of hyperlipidemia    Personal history of other endocrine, nutritional and metabolic disease 2022    History of hyperlipidemia    Rosacea blepharoconjunctivitis     Type 2 diabetes mellitus     Unspecified corneal ulcer, unspecified eye 2014    Peripheral ulcerative keratitis       Surgical History  Past Surgical History:   Procedure Laterality Date     SECTION, CLASSIC  2015     Section    CHOLECYSTECTOMY  2015    Cholecystectomy        Social History  She reports that she has never smoked. She has never been exposed to tobacco smoke. She has never used smokeless tobacco. She reports that she does not currently use alcohol. She reports that she does not use drugs.    Family History  Family History   Problem Relation Name Age of Onset    Glaucoma Neg Hx          Allergies  Patient has no known allergies.    Current Outpatient Medications   Medication Instructions    alcohol swabs pads, medicated 1 each, topical (top), 4 times daily, Use prior to checking glucose or injecting insulin    blood sugar diagnostic (Blood Glucose Test) strip 1 each, miscellaneous, 4 times daily, Use to check glucose 4 times daily, before meals and at bedtime    blood-glucose meter,continuous (Dexcom G7 ) misc Use as instructed    blood-glucose sensor (Dexcom G7 Sensor) device Use to check glucose, change every 10 days    ergocalciferol (Vitamin D-2) 1.25 MG (79170 UT) capsule oral    insulin aspart, with niacinamide, (Fiasp FlexTouch U-100 Insulin) 100 unit/mL (3 mL) injection 8 Units, subcutaneous, 3  "times daily (morning, midday, late afternoon)    lancets misc 1 each, miscellaneous, 4 times daily, Use to check glucose 4 times daily, before meals and at bedtime    lisinopril 10 mg, oral, Daily    metFORMIN (GLUCOPHAGE) 1,000 mg, oral, 2 times daily    metroNIDAZOLE (Metrocream) 0.75 % cream 1 Application, Topical, Daily    minocycline 100 mg, oral, Daily    Ozempic 2 mg, subcutaneous, Once Weekly    pen needle, diabetic 32 gauge x 5/32\" needle 1 each, miscellaneous, 4 times daily, Use to inject insulin 4 times daily    Tresiba FlexTouch U-100 25 Units, subcutaneous, Every morning        Review of Systems  Patient denies ocular pain, redness, discharge, decreased vision, double vision, blind spots, flashes, or floaters.        Physical Exam   Constitutional: No acute distress   HEENT: mucus membranes moist, atraumatic   Respiratory: no respiratory distress   Cardiovascular: no peripheral edema  Abdomen: non distended   MSK/neuro: moves all extremities spontaneously   Skin: no rashes   Psych: alert and oriented       Last Recorded Vitals  There were no vitals taken for this visit.    Relevant Results         Assessment/Plan   Assessment & Plan  Vitreous hemorrhage, right eye (Multi)      Plan right eye pars plana vitrectomy (PPV)/endolaser (EL)            Daniel Burk MD    "

## 2024-10-10 NOTE — OP NOTE
Vitrectomy Pars Plana, Membrane Peel w/Endolaser (R) Operative Note     Date: 10/10/2024  [place] [facility] [place] [facility] [place]    Name: Stacie Richmond, : 1960, Age: 63 y.o., MRN: [id number], Sex: female    Diagnosis  Pre-op Diagnosis      * Vitreous hemorrhage, right eye (Multi) [H43.11] Post-op Diagnosis     * Vitreous hemorrhage, right eye (Multi) [H43.11]     * Proliferative diabetic retinopathy of right eye associated with type 2 diabetes mellitus, macular edema presence unspecified [E11.3591]     * Neovascularization, retina, right [H35.051]     Procedures  25 Gauge Pars Plana Vitrectomy, Membrane Peel, Panretinal Photocoagulation Endolaser - Right Eye , 37831 - SD VTRECTOMY MCHNL PARS PLNA ENDOLASER PANRTA PC      Surgeons      * Jam Morales - Primary    Resident/Fellow/Other Assistant:  Surgeons and Role:     * Thomas Alcala MD - Assisting    Procedure Summary  Anesthesia: Monitor Anesthesia Care  ASA: III  Anesthesia Staff: Anesthesiologist: Laverne Mart MD  C-AA: KATE Reyes  Estimated Blood Loss: 0 mL  Intra-op Medications:   Administrations occurring from 0915 to 1100 on 10/10/24:   Medication Name Total Dose   sterile water irrigation solution 250 mL   tobramycin-dexamethasone (Tobradex) ophthalmic ointment 0.5 inch   triamcinolone acetonide (Kenalog-40) injection 40 mg          Anesthesia Record               Intraprocedure I/O Totals       None           Specimen: No specimens collected     Staff:   Circulator: Fiona  Scrub Person: Clare    Drains and/or Catheters: * None in log *    Tourniquet Times: n/a        Implants: n/a    Findings: vitreous hemorrhage, proliferative diabetic retinopathy with neovascularization elsewhere (NVE) - right eye.     Indications: Stacie Richmond is an 63 y.o. female who is having surgery for Vitreous hemorrhage, right eye (Multi) [H43.11].     The patient was seen in the preoperative area. The risks, benefits, complications, treatment options,  non-operative alternatives, expected recovery and outcomes were discussed with the patient. The possibilities of reaction to medication, pulmonary aspiration, injury to surrounding structures, bleeding, recurrent infection, the need for additional procedures, failure to diagnose a condition, and creating a complication requiring transfusion or operation were discussed with the patient. The patient concurred with the proposed plan, giving informed consent.  The site of surgery was properly noted/marked if necessary per policy. The patient has been actively warmed in preoperative area. Preoperative antibiotics are not indicated. Venous thrombosis prophylaxis are not indicated.    Procedure Details:     DATE OF SURGERY: 10/10/2024    SURGEON: Jam Morales MD    ASSISTANT: Thomas Alcala MD    PREOPERATIVE DIAGNOSIS  Pre-Op Diagnosis Codes:      * Vitreous hemorrhage, right eye (Multi) [H43.11]    POSTOPERATIVE DIAGNOSIS   Post-op Diagnosis     * Vitreous hemorrhage, right eye (Multi) [H43.11]     * Proliferative diabetic retinopathy of right eye associated with type 2 diabetes mellitus, macular edema presence unspecified [E11.3591]     * Neovascularization, retina, right [H35.051]    OPERATION PERFORMED  Repair of complex retinal detachment with:  25-gauge pars plana vitrectomy, membrane peel, panretinal photocoagulation endolaser - right eye    ANESTHESIA  Monitored anesthesia care with a standard block consisting of a 1:1 mixture of 4 %  lidocaine and 0.75% Marcaine with Wydase     FINDINGS  As detailed below     COMPLICATIONS  None     ESTIMATED BLOOD LOSS   Minimal     SPECIMENS REMOVED  None     JUSTIFICATION  Ms. Richmond is a 63 y.o. female with  vitreous hemorrhage, proliferative diabetic retinopathy with neovascularization elsewhere (NVE) - right eye. . This was causing decreased visual function. The risks, benefits, and alternatives to the procedure were discussed with the patient including the risk for vision  loss, blindness, retinal detachment, infection, bleeding, pain, high or low pressure in the eye, double vision, no benefit, need for additional procedures, and droopy eyelid, amongst others. The patient had a full opportunity to have all questions answered in clinic prior to surgery. Afterwards, the patient requested that we perform surgery and signed the consent form.     PROCEDURE:   The patient was brought to the preoperative holding area where the correct eye was confirmed and marked. The patient then underwent intravenous sedation by the anesthesia team followed by a block as described above. The patient was then brought to the operating room where a secondary time-out was performed to identify the correct patient, eye, procedure, and any allergies. The eye was prepped and draped in the usual sterile ophthalmic fashion followed by placement of a lid speculum.     A 25-gauge trocar was placed in the inferotemporal quadrant 4 mm posterior to the limbus after conjunctival displacement.  A 4 mm infusion cannula was placed through this trocar, and the infusion cannula was confirmed in the vitreous cavity prior to turning it on. Two additional 25-gauge trocars were placed in a similar fashion in the superonasal and superotemporal quadrants 4  mm posterior to the limbus after conjunctival displacement.     At this time, a standard three-port pars plana vitrectomy was performed using the light pipe, the cutter, and the BIOM viewing system. A core vitreous dissection was performed and dense vitreous hemorrhage was evacuated from the posterior segment. The retina was inspected and was found to be attached, however there were areas of neovascularization elsewhere (NVE) within the midperiphery and arcades. The macula was attached. A partial posterior vitreous detachment was noted with traction at the posterior fibrovascular neovascularization elsewhere (NVE) membranes.  The markus-posterior vitreous traction was released  for 360 degrees, isolating the posterior fibrovascular membranes. A thorough peripheral vitreous dissection was performed. The vitreous was stained with kenalog which revealed vitreoschisis and an adherent posterior hyaloid which was still attached to the macula, arcades and midperiphery.  The posterior hyaloid was peeled and removed from the underlying retina using a combination of the vitrector, serrated forceps, and flexloop scraper.  During hyaloid/membrane removal Intravitreal diathermy was used to ensure hemostasis. In addition a small retinal hole was formed within the inferior midperiphery. The endolaser was brought into the field 2 rows of barrier laser were applied around the retinal hole. The endolaser was also used to perform panretinal photocoagulation from the arcades to approximately the ora yolande for 360 degrees. Laser Settings: Power - 250mW, Duration: 100ms, Interval: 150ms, Shot Count: [id number], Total Energy: 32.81 Joules.        The superonasal trocar was removed and the sclerotomy was found to be watertight. The superotemporal trocar was removed and the sclerotomy was found to be watertight. The infusion cannula and associated trocar were removed and the sclerotomy was found to be watertight. The eye was confirmed to be at a physiologic level by digital palpation.    Subconjunctival injection of Cefazolin and Dexamethasone were administered. The lid speculum and drapes were removed. Antibiotic ointment was applied. The eye was patched and shielded. The patient tolerated the procedure well without any intraoperative or immediate postoperative complications. The patient was taken to the recovery room in good condition.  The patient will follow up with Thomas Alcala MD  in clinic on the following afternoon.         Complications:  None; patient tolerated the procedure well.    Disposition: PACU - hemodynamically stable.  Condition: stable     Additional Details:     A qualified resident physician  was not available and the use of a skilled assistant surgeon, Thomas Alcala MD, was required for the following portions of this case: 25 Gauge Pars Plana Vitrectomy, Membrane Peel, Panretinal Photocoagulation Endolaser - Right Eye    Attending Attestation:     Jam Morales  Phone Number: [phone]       abdominal pain

## 2024-10-10 NOTE — ANESTHESIA POSTPROCEDURE EVALUATION
Patient: Stacie Richmond    Procedure Summary       Date: 10/10/24 Room / Location[place] [facility] [place] [facility] [place] 04 / Virtual [facility] [place] [facility]    Anesthesia Start: 0844 Anesthesia Stop: 1011    Procedure: Vitrectomy Pars Plana w/Endolaser (Right: Eye) Diagnosis:       Vitreous hemorrhage, right eye (Multi)      Proliferative diabetic retinopathy of right eye associated with type 2 diabetes mellitus, macular edema presence unspecified      Neovascularization, retina, right      (Vitreous hemorrhage, right eye (Multi) [H43.11])    Surgeons: Jam Morales MD Responsible Provider: Laverne Mart MD    Anesthesia Type: MAC ASA Status: 3            Anesthesia Type: MAC    Vitals Value Taken Time   /75 10/10/24 1033   Temp 36.7 °C (98.1 °F) 10/10/24 1033   Pulse 79 10/10/24 1033   Resp 16 10/10/24 1033   SpO2 96 % 10/10/24 1033       Anesthesia Post Evaluation    Patient location during evaluation: PACU  Patient participation: complete - patient cannot participate  Level of consciousness: awake  Pain score: 0  Pain management: adequate  Airway patency: patent  Cardiovascular status: acceptable  Respiratory status: acceptable  Hydration status: acceptable  Postoperative Nausea and Vomiting: none        No notable events documented.

## 2024-10-10 NOTE — BRIEF OP NOTE
Date: 10/10/2024  OR Location: [facility] OR    Name: Stacie Richmond, : 1960, Age: 63 y.o., MRN: [id number], Sex: female    Diagnosis  Pre-op Diagnosis      * Vitreous hemorrhage, right eye (Multi) [H43.11] Post-op Diagnosis     * Vitreous hemorrhage, right eye (Multi) [H43.11]     * Proliferative diabetic retinopathy of right eye associated with type 2 diabetes mellitus, macular edema presence unspecified [E11.3591]     * Neovascularization, retina, right [H35.051]     Procedures  25 Gauge Pars Plana Vitrectomy, Membrane Peel, Panretinal Photocoagulation Endolaser - Right Eye  00394 - [place] VTRECTOMY MCHNL PARS PLNA ENDOLASER PANRTA PC    Surgeons      * Jam Morales - Primary    Resident/Fellow/Other Assistant:  Surgeons and Role:     * Thomas Alcala MD - Assisting    Procedure Summary  Anesthesia: Monitor Anesthesia Care  ASA: III  Anesthesia Staff: Anesthesiologist: Laverne Mart MD  C-AA: KATE Reyes  Estimated Blood Loss: 0mL    Intra-op Medications:   Administrations occurring from 0915 to 1100 on 10/10/24:   Medication Name Total Dose   sterile water irrigation solution 250 mL   tobramycin-dexamethasone (Tobradex) ophthalmic ointment 0.5 inch   triamcinolone acetonide (Kenalog-40) injection 40 mg          Anesthesia Record               Intraprocedure I/O Totals       None           Specimen: No specimens collected     Staff:   Circulator: Fiona Hernandez Person: Clare    Findings: Vitreous Hemorrhage, Proliferative Diabetic Retinopathy with neovascularization elsewhere (NVE) - Right Eye    Complications:  None; patient tolerated the procedure well.     Disposition: PACU - hemodynamically stable.  Condition: stable  Specimens Collected: No specimens collected    Attending Attestation:     A qualified resident physician was not available and the use of a skilled assistant surgeon, Thomas Alcala MD, was required for the following portions of this case: 25 Gauge Pars Plana Vitrectomy, Membrane  Peel, Panretinal Photocoagulation Endolaser - Right Eye    Jam Morales  Phone Number: [phone]

## 2024-10-11 ENCOUNTER — OFFICE VISIT (OUTPATIENT)
Dept: OPHTHALMOLOGY | Facility: CLINIC | Age: 64
End: 2024-10-11
Payer: COMMERCIAL

## 2024-10-11 DIAGNOSIS — E11.3512 PROLIFERATIVE DIABETIC RETINOPATHY OF LEFT EYE WITH MACULAR EDEMA ASSOCIATED WITH TYPE 2 DIABETES MELLITUS: ICD-10-CM

## 2024-10-11 DIAGNOSIS — H43.11 VITREOUS HEMORRHAGE, RIGHT EYE (MULTI): Primary | ICD-10-CM

## 2024-10-11 PROCEDURE — 3048F LDL-C <100 MG/DL: CPT | Performed by: OPHTHALMOLOGY

## 2024-10-11 PROCEDURE — 4010F ACE/ARB THERAPY RXD/TAKEN: CPT | Performed by: OPHTHALMOLOGY

## 2024-10-11 PROCEDURE — 1036F TOBACCO NON-USER: CPT | Performed by: OPHTHALMOLOGY

## 2024-10-11 PROCEDURE — 3046F HEMOGLOBIN A1C LEVEL >9.0%: CPT | Performed by: OPHTHALMOLOGY

## 2024-10-11 PROCEDURE — 3060F POS MICROALBUMINURIA REV: CPT | Performed by: OPHTHALMOLOGY

## 2024-10-11 PROCEDURE — 99024 POSTOP FOLLOW-UP VISIT: CPT | Performed by: OPHTHALMOLOGY

## 2024-10-11 ASSESSMENT — CONF VISUAL FIELD
OD_SUPERIOR_NASAL_RESTRICTION: 0
OS_SUPERIOR_TEMPORAL_RESTRICTION: 0
OS_INFERIOR_TEMPORAL_RESTRICTION: 0
OD_SUPERIOR_TEMPORAL_RESTRICTION: 0
OS_INFERIOR_NASAL_RESTRICTION: 0
OD_INFERIOR_TEMPORAL_RESTRICTION: 0
OD_INFERIOR_NASAL_RESTRICTION: 0
OS_SUPERIOR_NASAL_RESTRICTION: 0
METHOD: COUNTING FINGERS
OD_NORMAL: 1

## 2024-10-11 ASSESSMENT — PACHYMETRY
OD_CT(UM): 13
OS_CT(UM): 13

## 2024-10-11 ASSESSMENT — SLIT LAMP EXAM - LIDS
COMMENTS: 3+ MGD WITH TOOTHPASTE SECRETIONS
COMMENTS: 3+ MGD WITH TOOTHPASTE SECRETIONS

## 2024-10-11 ASSESSMENT — CUP TO DISC RATIO: OD_RATIO: .4

## 2024-10-11 ASSESSMENT — TONOMETRY
OD_IOP_MMHG: 13
IOP_METHOD: TONOPEN

## 2024-10-11 ASSESSMENT — EXTERNAL EXAM - LEFT EYE: OS_EXAM: SKIN  - ROSACEA

## 2024-10-11 ASSESSMENT — VISUAL ACUITY
METHOD: SNELLEN - LINEAR
OD_SC: 20/50

## 2024-10-11 ASSESSMENT — EXTERNAL EXAM - RIGHT EYE: OD_EXAM: SKIN  - ROSACEA

## 2024-10-11 NOTE — LETTER
October 11, 2024     Patient: Stacie Richmond   YOB: 1960   Date of Visit: 10/11/2024       To Whom It May Concern:    It is my medical opinion that Stacie Richmond may return to work on 10/28/24. The patient had recent surgery and needs time to heal.  .    If you have any questions or concerns, please don't hesitate to call.         Sincerely,        Jam Morales MD    CC: No Recipients

## 2024-10-11 NOTE — PROGRESS NOTES
Subjective   Assessment/Plan     Here POD#1 status post (s/p) pars plana vitrectomy (PPV)/endolaser (EL) OD   Vision has improved from CF to 20/50, intraocular pressure (IOP) WNL 13     PRIOR COURSE:     #PDR both eyes  #Vitreous hemorrhage both eyes (resolving left eye, new right eye today)  -VH has been resolving left eye  -Status post (s/p) panretinal photocoagulation (PRP) left eye #1 (1069 shots) last visit 8/5/24  -left eye Subjectively vision slightly improved, floaters improving  -Plan today was for OPTOS fluorescein angiography (FA) to assess response to panretinal photocoagulation (PRP) left eye, as well as check for neovascularization elsewhere (NVE) right eye given highly suspicious appearance  -Had sudden onset of decreased vision right eye this past Monday, secondary to new VH right eye   -OPTOS fluorescein angiography (FA) right eye limited by VH but does show significant ischemia as well as areas of leakage consistent with neovascularization elsewhere (NVE)  -fluorescein angiography (FA) left eye with what appears to be resolving neovascularization elsewhere (NVE) but still with good room for fill in, will plan for likely fill in panretinal photocoagulation (PRP) left eye once right eye improved  -Follow up 1-2 weeks with me, if not improving would likely benefit from pars plana vitrectomy (PPV)/endolaser (EL) right eye

## 2024-10-20 NOTE — PROGRESS NOTES
"Subjective   Patient ID: Stacie Richmond is a 63 y.o. female.    Chief Complaint    Post-op Follow-up       HPI       Post-op Follow-up    In right eye.  Discomfort includes Negative for pain.  Vision is improved.             Comments    63 Year old female presents for POW1 s/p PPV/EL OD. She states vision is a little better OD but is worse OS. She denies any eye pain.           Last edited by MICHEAL Winters on 10/21/2024 10:54 AM.        Current Outpatient Medications (Ophthalmology pharm classes)   Medication Sig Dispense Refill    prednisoLONE acetate (Pred-Forte) 1 % ophthalmic suspension Administer 1 drop into the right eye 4 times a day. 10 mL 2     Current Outpatient Medications (Other)   Medication Sig Dispense Refill    alcohol swabs pads, medicated Apply 1 each topically 4 times a day. Use prior to checking glucose or injecting insulin 400 each 3    blood sugar diagnostic (Blood Glucose Test) strip 1 each 4 times a day. Use to check glucose 4 times daily, before meals and at bedtime 400 strip 3    blood-glucose meter,continuous (Dexcom G7 ) misc Use as instructed 1 each 0    blood-glucose sensor (Dexcom G7 Sensor) device Use to check glucose, change every 10 days 3 each 11    ergocalciferol (Vitamin D-2) 1.25 MG (04919 UT) capsule Take by mouth.      insulin aspart, with niacinamide, (Fiasp FlexTouch U-100 Insulin) 100 unit/mL (3 mL) injection Inject 8 Units under the skin 3 times a day with meals. 21.6 mL 3    lancets misc 1 each 4 times a day. Use to check glucose 4 times daily, before meals and at bedtime 400 each 3    lisinopril 10 mg tablet Take 1 tablet (10 mg) by mouth once daily. 90 tablet 3    metFORMIN (Glucophage) 1,000 mg tablet Take 1 tablet (1,000 mg) by mouth 2 times a day. 180 tablet 3    metroNIDAZOLE (Metrocream) 0.75 % cream Apply 1 Application topically once daily. 45 g 11    pen needle, diabetic 32 gauge x 5/32\" needle 1 each 4 times a day. Use to inject insulin 4 times " daily 400 each 3    semaglutide (Ozempic) 2 mg/dose (8 mg/3 mL) pen injector Inject 2 mg under the skin 1 (one) time per week. 9 mL 3    Tresiba FlexTouch U-100 100 unit/mL (3 mL) injection Inject 25 Units under the skin once daily in the morning. 22.5 mL 3       Objective   Base Eye Exam       Visual Acuity (Snellen - Linear)         Right Left    Dist sc 20/40 -1 CF 2 ft    Dist ph sc 20/30 -2 NI              Tonometry (Goldmann Applanation, 10:56 AM)         Right Left    Pressure 14 16              Pachymetry (8/5/2024)         Right Left    Thickness 13 13              Pupils         Dark React    Right 2mm none    Left 2mm none              Extraocular Movement         Right Left     Full Full              Neuro/Psych       Oriented x3: Yes    Mood/Affect: Normal              Dilation       Both eyes: 1% Mydriacyl & 2.5% Rickie  @ 10:56 AM                  Slit Lamp and Fundus Exam       External Exam         Right Left    External Skin  - Rosacea Skin  - Rosacea              Slit Lamp Exam         Right Left    Lids/Lashes 3+ MGD with toothpaste secretions 3+ MGD with toothpaste secretions    Conjunctiva/Sclera White and quiet. Pingueculae nasally and temporallyno reaction on eversion White/quiet with temp pingueculae    Cornea 1+ inf PEE, Endothelial pigmentEarly band keratopathy temporally 1+ inf PEE, Endothelial pigmentEarly band keratopathy temporally    Anterior Chamber Deep and quiet Deep and quiet    Iris Round and reactive, no NVI Round and reactive, no NVI    Lens 1+ Nuclear Sclerosis 2+ Nuclear Sclerosis    Anterior Vitreous vitrectomized, clear view central dense VH with view of periphery still there              Fundus Exam         Right Left    Disc Normal no view    C/D Ratio .4     Macula attached, DBH, MAs , edema poor view due to central VH    Vessels Normal Normal    Periphery Attached 360 panretinal photocoagulation (PRP) extensive DBH, suspected neovascularization elsewhere (NVE), 360 scatter  laser scars                Imaging    Macula OCT 10/21/24  Right eye (OD): Retinal thickening/IRF in nasal macula, no SRF  Left eye (OS): Very poor view, grossly attached/flat without apparent edema    Assessment/Plan     #PDR both eyes with DME right eye  #Vitreous hemorrhage both eyes (resolving left eye, new right eye 9/19/24)  Here POW#1 status post (s/p) pars plana vitrectomy (PPV)/endolaser (EL) OD  10/10/24  Vision has improved from CF to 20/30, intraocular pressure (IOP) WNL 14  Stop ofloxacin, taper pred TID followed by weekly taper  Will monitor hemorrhage left eye for now, but if not improving by next visit consider pars plana vitrectomy (PPV)/endolaser (EL) left eye   Also with some DME right eye - possible exacerbated by recent sx right eye  Will monitor today, if still present and visually significant next visit consider Avastin injection right eye

## 2024-10-21 ENCOUNTER — APPOINTMENT (OUTPATIENT)
Dept: OPHTHALMOLOGY | Facility: CLINIC | Age: 64
End: 2024-10-21
Payer: COMMERCIAL

## 2024-10-21 DIAGNOSIS — H43.11 VITREOUS HEMORRHAGE, RIGHT EYE (MULTI): Primary | ICD-10-CM

## 2024-10-21 DIAGNOSIS — E11.3513 PROLIFERATIVE DIABETIC RETINOPATHY OF BOTH EYES WITH MACULAR EDEMA ASSOCIATED WITH TYPE 2 DIABETES MELLITUS: ICD-10-CM

## 2024-10-21 PROCEDURE — 99024 POSTOP FOLLOW-UP VISIT: CPT | Performed by: OPHTHALMOLOGY

## 2024-10-21 PROCEDURE — 92134 CPTRZ OPH DX IMG PST SGM RTA: CPT | Performed by: OPHTHALMOLOGY

## 2024-10-21 ASSESSMENT — ENCOUNTER SYMPTOMS
PSYCHIATRIC NEGATIVE: 0
HEMATOLOGIC/LYMPHATIC NEGATIVE: 0
RESPIRATORY NEGATIVE: 0
ALLERGIC/IMMUNOLOGIC NEGATIVE: 0
EYES NEGATIVE: 1
CONSTITUTIONAL NEGATIVE: 0
CARDIOVASCULAR NEGATIVE: 0
NEUROLOGICAL NEGATIVE: 0
MUSCULOSKELETAL NEGATIVE: 0
ENDOCRINE NEGATIVE: 0
GASTROINTESTINAL NEGATIVE: 0

## 2024-10-21 ASSESSMENT — VISUAL ACUITY
OD_PH_SC+: -2
OD_SC: 20/40
METHOD: SNELLEN - LINEAR
OD_PH_SC: 20/30
OS_SC: CF 2 FT
OD_SC+: -1

## 2024-10-21 ASSESSMENT — CONF VISUAL FIELD
OS_SUPERIOR_NASAL_RESTRICTION: 0
OD_INFERIOR_TEMPORAL_RESTRICTION: 0
OD_SUPERIOR_TEMPORAL_RESTRICTION: 0
OS_INFERIOR_TEMPORAL_RESTRICTION: 0
OD_INFERIOR_NASAL_RESTRICTION: 0
OS_SUPERIOR_TEMPORAL_RESTRICTION: 0
OD_SUPERIOR_NASAL_RESTRICTION: 0
OS_INFERIOR_NASAL_RESTRICTION: 0

## 2024-10-21 ASSESSMENT — PACHYMETRY
OS_CT(UM): 13
OD_CT(UM): 13

## 2024-10-21 ASSESSMENT — SLIT LAMP EXAM - LIDS
COMMENTS: 3+ MGD WITH TOOTHPASTE SECRETIONS
COMMENTS: 3+ MGD WITH TOOTHPASTE SECRETIONS

## 2024-10-21 ASSESSMENT — EXTERNAL EXAM - LEFT EYE: OS_EXAM: SKIN  - ROSACEA

## 2024-10-21 ASSESSMENT — CUP TO DISC RATIO: OD_RATIO: .4

## 2024-10-21 ASSESSMENT — TONOMETRY
IOP_METHOD: GOLDMANN APPLANATION
OS_IOP_MMHG: 16
OD_IOP_MMHG: 14

## 2024-10-21 ASSESSMENT — EXTERNAL EXAM - RIGHT EYE: OD_EXAM: SKIN  - ROSACEA

## 2024-10-22 ENCOUNTER — APPOINTMENT (OUTPATIENT)
Dept: ENDOCRINOLOGY | Facility: CLINIC | Age: 64
End: 2024-10-22
Payer: COMMERCIAL

## 2024-11-04 ENCOUNTER — APPOINTMENT (OUTPATIENT)
Dept: OPHTHALMOLOGY | Facility: CLINIC | Age: 64
End: 2024-11-04
Payer: COMMERCIAL

## 2024-11-04 DIAGNOSIS — E11.3513 PROLIFERATIVE DIABETIC RETINOPATHY OF BOTH EYES WITH MACULAR EDEMA ASSOCIATED WITH TYPE 2 DIABETES MELLITUS: Primary | ICD-10-CM

## 2024-11-04 PROCEDURE — 99024 POSTOP FOLLOW-UP VISIT: CPT | Performed by: OPHTHALMOLOGY

## 2024-11-04 PROCEDURE — 3048F LDL-C <100 MG/DL: CPT | Performed by: OPHTHALMOLOGY

## 2024-11-04 PROCEDURE — 3060F POS MICROALBUMINURIA REV: CPT | Performed by: OPHTHALMOLOGY

## 2024-11-04 PROCEDURE — 92134 CPTRZ OPH DX IMG PST SGM RTA: CPT | Performed by: OPHTHALMOLOGY

## 2024-11-04 PROCEDURE — 3046F HEMOGLOBIN A1C LEVEL >9.0%: CPT | Performed by: OPHTHALMOLOGY

## 2024-11-04 PROCEDURE — 4010F ACE/ARB THERAPY RXD/TAKEN: CPT | Performed by: OPHTHALMOLOGY

## 2024-11-04 RX ORDER — PHENYLEPHRINE HYDROCHLORIDE 25 MG/ML
1 SOLUTION/ DROPS OPHTHALMIC
OUTPATIENT
Start: 2024-11-04 | End: 2024-11-04

## 2024-11-04 RX ORDER — PROPARACAINE HYDROCHLORIDE 5 MG/ML
1 SOLUTION/ DROPS OPHTHALMIC ONCE
OUTPATIENT
Start: 2024-11-04 | End: 2024-11-04

## 2024-11-04 RX ORDER — TROPICAMIDE 10 MG/ML
1 SOLUTION/ DROPS OPHTHALMIC
OUTPATIENT
Start: 2024-11-04 | End: 2024-11-04

## 2024-11-04 ASSESSMENT — PACHYMETRY
OS_CT(UM): 13
OD_CT(UM): 13

## 2024-11-04 ASSESSMENT — ENCOUNTER SYMPTOMS
GASTROINTESTINAL NEGATIVE: 0
CARDIOVASCULAR NEGATIVE: 0
PSYCHIATRIC NEGATIVE: 0
ENDOCRINE NEGATIVE: 0
HEMATOLOGIC/LYMPHATIC NEGATIVE: 0
EYES NEGATIVE: 1
NEUROLOGICAL NEGATIVE: 0
RESPIRATORY NEGATIVE: 0
ALLERGIC/IMMUNOLOGIC NEGATIVE: 0
MUSCULOSKELETAL NEGATIVE: 0
CONSTITUTIONAL NEGATIVE: 0

## 2024-11-04 ASSESSMENT — VISUAL ACUITY
METHOD: SNELLEN - LINEAR
OD_SC: 20/30
OD_SC+: +2
OS_SC: CF@3FT

## 2024-11-04 ASSESSMENT — SLIT LAMP EXAM - LIDS
COMMENTS: 3+ MGD WITH TOOTHPASTE SECRETIONS
COMMENTS: 3+ MGD WITH TOOTHPASTE SECRETIONS

## 2024-11-04 ASSESSMENT — TONOMETRY
IOP_METHOD: GOLDMANN APPLANATION
OD_IOP_MMHG: 16
OS_IOP_MMHG: 14

## 2024-11-04 ASSESSMENT — EXTERNAL EXAM - LEFT EYE: OS_EXAM: SKIN  - ROSACEA

## 2024-11-04 ASSESSMENT — CUP TO DISC RATIO: OD_RATIO: .4

## 2024-11-04 ASSESSMENT — EXTERNAL EXAM - RIGHT EYE: OD_EXAM: SKIN  - ROSACEA

## 2024-11-04 NOTE — PROGRESS NOTES
"Subjective   Patient ID: Stacie Richmond is a 63 y.o. female.            Current Outpatient Medications (Ophthalmology pharm classes)   Medication Sig Dispense Refill    prednisoLONE acetate (Pred-Forte) 1 % ophthalmic suspension Administer 1 drop into the right eye 4 times a day. 10 mL 2     Current Outpatient Medications (Other)   Medication Sig Dispense Refill    alcohol swabs pads, medicated Apply 1 each topically 4 times a day. Use prior to checking glucose or injecting insulin 400 each 3    blood sugar diagnostic (Blood Glucose Test) strip 1 each 4 times a day. Use to check glucose 4 times daily, before meals and at bedtime 400 strip 3    blood-glucose meter,continuous (Dexcom G7 ) misc Use as instructed 1 each 0    blood-glucose sensor (Dexcom G7 Sensor) device Use to check glucose, change every 10 days 3 each 11    ergocalciferol (Vitamin D-2) 1.25 MG (94717 UT) capsule Take by mouth.      insulin aspart, with niacinamide, (Fiasp FlexTouch U-100 Insulin) 100 unit/mL (3 mL) injection Inject 8 Units under the skin 3 times a day with meals. 21.6 mL 3    lancets misc 1 each 4 times a day. Use to check glucose 4 times daily, before meals and at bedtime 400 each 3    lisinopril 10 mg tablet Take 1 tablet (10 mg) by mouth once daily. 90 tablet 3    metFORMIN (Glucophage) 1,000 mg tablet Take 1 tablet (1,000 mg) by mouth 2 times a day. 180 tablet 3    metroNIDAZOLE (Metrocream) 0.75 % cream Apply 1 Application topically once daily. 45 g 11    pen needle, diabetic 32 gauge x 5/32\" needle 1 each 4 times a day. Use to inject insulin 4 times daily 400 each 3    semaglutide (Ozempic) 2 mg/dose (8 mg/3 mL) pen injector Inject 2 mg under the skin 1 (one) time per week. 9 mL 3    Tresiba FlexTouch U-100 100 unit/mL (3 mL) injection Inject 25 Units under the skin once daily in the morning. 22.5 mL 3       Imaging  Macula OCT   Right eye (OD): Retinal thickening/IRF in nasal macula, no subretinal fluid (SRF), mildly " improved  Left eye (OS): Very poor view, grossly attached/flat without apparent edema    Assessment/Plan     #PDR both eyes with DME right eye  #Vitreous hemorrhage both eyes (persistent left eye, new right eye 9/19/24)  Here POM#1 status post (s/p) pars plana vitrectomy (PPV)/endolaser (EL) OD  10/10/24  -Vision remains good right eye, intraocular pressure (IOP) wnl  -On PF daily, can stop  -Some continued DME right eye but improving with moderate BCVA  -Dense nonresolving central VH left eye   -For right eye, would consider anti-VEGF injection, but as improving patient would prefer to monitor for now.   -For left eye, given visually significnat nonresolving VH, would recommend pars plana vitrectomy (PPV)/endolaser (EL) left eye. Discussed r/b/a/c of surgery. Also discussed option of pretreatment with anti-VEGF in week prior to sx. Patient desires to proceed with surgery, would prefer not to recieive injection.   -Consented, will schedule, f/up POD1

## 2024-11-06 PROBLEM — E11.3513 PROLIFERATIVE DIABETIC RETINOPATHY OF BOTH EYES WITH MACULAR EDEMA ASSOCIATED WITH TYPE 2 DIABETES MELLITUS: Status: ACTIVE | Noted: 2024-11-04

## 2024-11-15 ENCOUNTER — ANESTHESIA EVENT (OUTPATIENT)
Dept: OPERATING ROOM | Facility: CLINIC | Age: 64
End: 2024-11-15
Payer: COMMERCIAL

## 2024-11-18 ENCOUNTER — HOSPITAL ENCOUNTER (OUTPATIENT)
Facility: CLINIC | Age: 64
Setting detail: OUTPATIENT SURGERY
Discharge: HOME | End: 2024-11-18
Attending: OPHTHALMOLOGY | Admitting: OPHTHALMOLOGY
Payer: COMMERCIAL

## 2024-11-18 ENCOUNTER — ANESTHESIA (OUTPATIENT)
Dept: OPERATING ROOM | Facility: CLINIC | Age: 64
End: 2024-11-18
Payer: COMMERCIAL

## 2024-11-18 VITALS
SYSTOLIC BLOOD PRESSURE: 136 MMHG | RESPIRATION RATE: 16 BRPM | TEMPERATURE: 97.2 F | WEIGHT: 139.99 LBS | OXYGEN SATURATION: 96 % | DIASTOLIC BLOOD PRESSURE: 74 MMHG | HEIGHT: 59 IN | HEART RATE: 69 BPM | BODY MASS INDEX: 28.22 KG/M2

## 2024-11-18 DIAGNOSIS — H43.12 VITREOUS HEMORRHAGE OF LEFT EYE (MULTI): ICD-10-CM

## 2024-11-18 DIAGNOSIS — E11.3592 PROLIFERATIVE DIABETIC RETINOPATHY OF LEFT EYE WITHOUT MACULAR EDEMA ASSOCIATED WITH TYPE 2 DIABETES MELLITUS: ICD-10-CM

## 2024-11-18 DIAGNOSIS — E11.3592 PROLIFERATIVE DIABETIC RETINOPATHY OF LEFT EYE ASSOCIATED WITH TYPE 2 DIABETES MELLITUS, MACULAR EDEMA PRESENCE UNSPECIFIED: Primary | ICD-10-CM

## 2024-11-18 DIAGNOSIS — E11.3513 PROLIFERATIVE DIABETIC RETINOPATHY OF BOTH EYES WITH MACULAR EDEMA ASSOCIATED WITH TYPE 2 DIABETES MELLITUS: ICD-10-CM

## 2024-11-18 LAB — GLUCOSE BLD MANUAL STRIP-MCNC: 98 MG/DL (ref 74–99)

## 2024-11-18 PROCEDURE — 3600000008 HC OR TIME - EACH INCREMENTAL 1 MINUTE - PROCEDURE LEVEL THREE: Performed by: OPHTHALMOLOGY

## 2024-11-18 PROCEDURE — 67040 LASER TREATMENT OF RETINA: CPT | Performed by: STUDENT IN AN ORGANIZED HEALTH CARE EDUCATION/TRAINING PROGRAM

## 2024-11-18 PROCEDURE — 82947 ASSAY GLUCOSE BLOOD QUANT: CPT

## 2024-11-18 PROCEDURE — 2720000007 HC OR 272 NO HCPCS: Performed by: OPHTHALMOLOGY

## 2024-11-18 PROCEDURE — 2500000005 HC RX 250 GENERAL PHARMACY W/O HCPCS: Performed by: OPHTHALMOLOGY

## 2024-11-18 PROCEDURE — A67113 PR REPAIR COMPLEX RETINA DETACH VITRECTOMY AND MEMB PEEL: Performed by: STUDENT IN AN ORGANIZED HEALTH CARE EDUCATION/TRAINING PROGRAM

## 2024-11-18 PROCEDURE — 7100000010 HC PHASE TWO TIME - EACH INCREMENTAL 1 MINUTE: Performed by: OPHTHALMOLOGY

## 2024-11-18 PROCEDURE — 2500000004 HC RX 250 GENERAL PHARMACY W/ HCPCS (ALT 636 FOR OP/ED): Performed by: OPHTHALMOLOGY

## 2024-11-18 PROCEDURE — 3700000001 HC GENERAL ANESTHESIA TIME - INITIAL BASE CHARGE: Performed by: OPHTHALMOLOGY

## 2024-11-18 PROCEDURE — 2500000004 HC RX 250 GENERAL PHARMACY W/ HCPCS (ALT 636 FOR OP/ED): Performed by: STUDENT IN AN ORGANIZED HEALTH CARE EDUCATION/TRAINING PROGRAM

## 2024-11-18 PROCEDURE — 3700000002 HC GENERAL ANESTHESIA TIME - EACH INCREMENTAL 1 MINUTE: Performed by: OPHTHALMOLOGY

## 2024-11-18 PROCEDURE — 7100000009 HC PHASE TWO TIME - INITIAL BASE CHARGE: Performed by: OPHTHALMOLOGY

## 2024-11-18 PROCEDURE — 67040 LASER TREATMENT OF RETINA: CPT | Performed by: OPHTHALMOLOGY

## 2024-11-18 PROCEDURE — 3600000003 HC OR TIME - INITIAL BASE CHARGE - PROCEDURE LEVEL THREE: Performed by: OPHTHALMOLOGY

## 2024-11-18 RX ORDER — ACETAMINOPHEN 325 MG/1
650 TABLET ORAL EVERY 4 HOURS PRN
Status: DISCONTINUED | OUTPATIENT
Start: 2024-11-18 | End: 2024-11-18 | Stop reason: HOSPADM

## 2024-11-18 RX ORDER — FENTANYL CITRATE 50 UG/ML
INJECTION, SOLUTION INTRAMUSCULAR; INTRAVENOUS AS NEEDED
Status: DISCONTINUED | OUTPATIENT
Start: 2024-11-18 | End: 2024-11-18

## 2024-11-18 RX ORDER — ONDANSETRON HYDROCHLORIDE 2 MG/ML
4 INJECTION, SOLUTION INTRAVENOUS ONCE AS NEEDED
Status: DISCONTINUED | OUTPATIENT
Start: 2024-11-18 | End: 2024-11-18 | Stop reason: HOSPADM

## 2024-11-18 RX ORDER — OFLOXACIN 3 MG/ML
1 SOLUTION/ DROPS OPHTHALMIC 4 TIMES DAILY
Qty: 5 ML | Refills: 0 | Status: SHIPPED | OUTPATIENT
Start: 2024-11-18 | End: 2024-11-25

## 2024-11-18 RX ORDER — PREDNISOLONE ACETATE 10 MG/ML
1 SUSPENSION/ DROPS OPHTHALMIC 4 TIMES DAILY
Qty: 5 ML | Refills: 3 | Status: SHIPPED | OUTPATIENT
Start: 2024-11-18 | End: 2024-12-18

## 2024-11-18 RX ORDER — FENTANYL CITRATE 50 UG/ML
25 INJECTION, SOLUTION INTRAMUSCULAR; INTRAVENOUS EVERY 5 MIN PRN
Status: DISCONTINUED | OUTPATIENT
Start: 2024-11-18 | End: 2024-11-18 | Stop reason: HOSPADM

## 2024-11-18 RX ORDER — PROPARACAINE HYDROCHLORIDE 5 MG/ML
1 SOLUTION/ DROPS OPHTHALMIC ONCE
Status: COMPLETED | OUTPATIENT
Start: 2024-11-18 | End: 2024-11-18

## 2024-11-18 RX ORDER — TROPICAMIDE 10 MG/ML
1 SOLUTION/ DROPS OPHTHALMIC
Status: COMPLETED | OUTPATIENT
Start: 2024-11-18 | End: 2024-11-18

## 2024-11-18 RX ORDER — BUPIVACAINE HYDROCHLORIDE 7.5 MG/ML
INJECTION, SOLUTION EPIDURAL; RETROBULBAR AS NEEDED
Status: DISCONTINUED | OUTPATIENT
Start: 2024-11-18 | End: 2024-11-18 | Stop reason: HOSPADM

## 2024-11-18 RX ORDER — TRIAMCINOLONE ACETONIDE 40 MG/ML
INJECTION, SUSPENSION INTRA-ARTICULAR; INTRAMUSCULAR AS NEEDED
Status: DISCONTINUED | OUTPATIENT
Start: 2024-11-18 | End: 2024-11-18 | Stop reason: HOSPADM

## 2024-11-18 RX ORDER — WATER 1 ML/ML
IRRIGANT IRRIGATION AS NEEDED
Status: DISCONTINUED | OUTPATIENT
Start: 2024-11-18 | End: 2024-11-18 | Stop reason: HOSPADM

## 2024-11-18 RX ORDER — ALBUTEROL SULFATE 0.83 MG/ML
2.5 SOLUTION RESPIRATORY (INHALATION) ONCE AS NEEDED
Status: DISCONTINUED | OUTPATIENT
Start: 2024-11-18 | End: 2024-11-18 | Stop reason: HOSPADM

## 2024-11-18 RX ORDER — PROPOFOL 10 MG/ML
INJECTION, EMULSION INTRAVENOUS AS NEEDED
Status: DISCONTINUED | OUTPATIENT
Start: 2024-11-18 | End: 2024-11-18

## 2024-11-18 RX ORDER — MIDAZOLAM HYDROCHLORIDE 1 MG/ML
INJECTION, SOLUTION INTRAVENOUS AS NEEDED
Status: DISCONTINUED | OUTPATIENT
Start: 2024-11-18 | End: 2024-11-18

## 2024-11-18 RX ORDER — DEXAMETHASONE SODIUM PHOSPHATE 10 MG/ML
INJECTION INTRAMUSCULAR; INTRAVENOUS AS NEEDED
Status: DISCONTINUED | OUTPATIENT
Start: 2024-11-18 | End: 2024-11-18 | Stop reason: HOSPADM

## 2024-11-18 RX ORDER — POVIDONE-IODINE 5 %
SOLUTION, NON-ORAL OPHTHALMIC (EYE) AS NEEDED
Status: DISCONTINUED | OUTPATIENT
Start: 2024-11-18 | End: 2024-11-18 | Stop reason: HOSPADM

## 2024-11-18 RX ORDER — CEFAZOLIN 1 G/1
INJECTION, POWDER, FOR SOLUTION INTRAVENOUS AS NEEDED
Status: DISCONTINUED | OUTPATIENT
Start: 2024-11-18 | End: 2024-11-18 | Stop reason: HOSPADM

## 2024-11-18 RX ORDER — FENTANYL CITRATE 50 UG/ML
50 INJECTION, SOLUTION INTRAMUSCULAR; INTRAVENOUS EVERY 5 MIN PRN
Status: DISCONTINUED | OUTPATIENT
Start: 2024-11-18 | End: 2024-11-18 | Stop reason: HOSPADM

## 2024-11-18 RX ORDER — PHENYLEPHRINE HYDROCHLORIDE 25 MG/ML
1 SOLUTION/ DROPS OPHTHALMIC
Status: COMPLETED | OUTPATIENT
Start: 2024-11-18 | End: 2024-11-18

## 2024-11-18 SDOH — HEALTH STABILITY: MENTAL HEALTH: CURRENT SMOKER: 0

## 2024-11-18 ASSESSMENT — PAIN - FUNCTIONAL ASSESSMENT
PAIN_FUNCTIONAL_ASSESSMENT: 0-10
PAIN_FUNCTIONAL_ASSESSMENT: 0-10

## 2024-11-18 ASSESSMENT — PAIN SCALES - GENERAL
PAINLEVEL_OUTOF10: 0 - NO PAIN
PAINLEVEL_OUTOF10: 0 - NO PAIN

## 2024-11-18 NOTE — DISCHARGE INSTRUCTIONS
Please keep the eye patched/shielded until your post op day 1 appointment tomorrow.    Appointment:  [facility], [address] (3rd floor), Please report to clinic at 11AM - you will be seen by Brittani Basilio MD     Please do not perform any strenuous activity, bending below the waist, until you are cleared by your doctor.     Post Operative Medications: You will start your eye drops tomorrow after your post operative day 1 appointment   Antibiotic (Tan Cap) - 4 times per day              Steroid (Pink Cap) - 4 times per day   The order of drop instillation does not matter but you must wait atleast 5 minutes in between drops to ensure that the medications absorb properly    Positioning: You have an air-bubble in your eye after surgery which will dissolve in the next 3-5 days. Stay upright during the day.  At night you may sleep on either side. DO NOT LIE FLAT ON YOUR BACK while you have the air-bubble in the eye.

## 2024-11-18 NOTE — ANESTHESIA PREPROCEDURE EVALUATION
Patient: Stacie Richmond    Procedure Information       Date/Time: 11/18/24 1130    Procedure: Vitrectomy Pars Plana w/Endolaser (Left)    Location: [facility] [place] [facility] OR 04 / Virtual [facility] [place] [facility] OR    Surgeons: Jam Morales MD            Relevant Problems   Cardiac   (+) HTN (hypertension)      Endocrine   (+) Diabetes mellitus type 2 without retinopathy (Multi)   (+) NPDR (nonproliferative diabetic retinopathy) (Multi)   (+) Proliferative diabetic retinopathy of both eyes with macular edema associated with type 2 diabetes mellitus   (+) Proliferative diabetic retinopathy of left eye associated with type 2 diabetes mellitus (Multi)   (+) Type 2 diabetes mellitus with hyperglycemia, with long-term current use of insulin       Clinical information reviewed:   Tobacco  Allergies  Meds   Med Hx  Surg Hx   Fam Hx  Soc Hx        NPO Detail:  No data recorded     Physical Exam    Airway  Mallampati: III     Cardiovascular   Rhythm: regular  Rate: normal     Dental    Pulmonary   Breath sounds clear to auscultation     Abdominal          Anesthesia Plan    History of general anesthesia?: yes  History of complications of general anesthesia?: no    ASA 3     MAC     The patient is not a current smoker.    intravenous induction   Anesthetic plan and risks discussed with patient.    Plan discussed with resident and attending.

## 2024-11-18 NOTE — BRIEF OP NOTE
Date: 2024  OR Location: [facility] [place] [facility] OR    Name: Stacie Richmond, : 1960, Age: 63 y.o., MRN: [id number], Sex: female    Diagnosis  Pre-op Diagnosis      * Proliferative diabetic retinopathy of both eyes with macular edema associated with type 2 diabetes mellitus [E11.3513] Post-op Diagnosis     * Proliferative diabetic retinopathy of left eye without macular edema associated with type 2 diabetes mellitus [E11.3592]     * Vitreous hemorrhage of left eye (Multi) [H43.12]     Procedures  Vitrectomy Pars Plana w/Endolaser  20188 - NE VTRECTOMY [facility]NL PARS PLNA ENDOLASER PANRTA PC      Surgeons      * Jam Morales - Primary    Resident/Fellow/Other Assistant:  Surgeons and Role:     * Thomas Alcala MD - Assisting    Staff:   Circulator: Alee Hernandez Person: Clare    Anesthesia Staff: Anesthesiologist: David Fisher MD  Anesthesia Resident: Kalin Metcalf, DO    Procedure Summary  Anesthesia: Monitor Anesthesia Care  ASA: III  Estimated Blood Loss: 0 mL  Intra-op Medications:   Administrations occurring from 1130 to 1330 on 24:   Medication Name Total Dose   ceFAZolin (Ancef) injection 50 mg   balanced salts (BSS) intraocular solution 15 mL   balanced salts (BSS Plus) intraocular solution 500 mL   sterile water irrigation solution 100 mL   dexAMETHasone (Decadron) injection 5 mg   povidone-iodine 5 % ophthalmic solution 1 Application   bupivacaine PF (Marcaine) injection 0.75 % 3.5 mL   tobramycin-dexamethasone (Tobradex) ophthalmic ointment 0.5 inch   chondroitin sulf-sod hyaluron (Viscoat) intraocular injection 0.5 mL   fentaNYL (Sublimaze) injection 50 mcg/mL 75 mcg   midazolam (Versed) IV infusion 1 mg/mL 2 mg   propofol (Diprivan) injection 10 mg/mL 30 mg          Anesthesia Record               Intraprocedure I/O Totals          Intake    Midazolam 0.00 mL    The total shown is the total volume documented since Anesthesia Start was filed.    Total Intake 0 mL          Specimen: No specimens  collected       Findings: Vitreous hemorrhage, proliferative diabetic retinopathy - left eye    Complications:  None; patient tolerated the procedure well.     Disposition: PACU - hemodynamically stable.  Condition: stable  Specimens Collected: No specimens collected    Attending Attestation:     A qualified resident physician was not available and the use of a skilled assistant surgeon, Thomas Alcala MD, was required for the following portions of this case: pars plana vitrectomy, endolaser, partial air-fluid exchange - LEFT EYE    Jam Morales  Phone Number: [phone]

## 2024-11-18 NOTE — ANESTHESIA POSTPROCEDURE EVALUATION
Patient: Stacie Richmond    Procedure Summary       Date: 11/18/24 Room / Location: [facility] [place] [facility] OR 04 / [facility] [place] [facility] OR    Anesthesia Start: 1201 Anesthesia Stop:     Procedure: Vitrectomy Pars Plana w/Endolaser (Left: Eye) Diagnosis:       Proliferative diabetic retinopathy of both eyes with macular edema associated with type 2 diabetes mellitus      (Proliferative diabetic retinopathy of both eyes with macular edema associated with type 2 diabetes mellitus [E11.3513])    Surgeons: Jam Morales MD Responsible Provider: David Fisher MD    Anesthesia Type: MAC ASA Status: 3            Anesthesia Type: MAC    Vitals Value Taken Time   /85 11/18/24 1248   Temp 36 11/18/24 1248   Pulse 75 11/18/24 1248   Resp 13 11/18/24 1248   SpO2 97 11/18/24 1248       Anesthesia Post Evaluation    Patient location during evaluation: PACU  Patient participation: complete - patient participated  Level of consciousness: awake and awake and alert  Pain management: adequate  Airway patency: patent  Cardiovascular status: acceptable  Respiratory status: acceptable  Hydration status: acceptable  Postoperative Nausea and Vomiting: none        No notable events documented.

## 2024-11-18 NOTE — OP NOTE
Vitrectomy Pars Plana w/Endolaser (L) Operative Note     Date: 2024  OR Location: [facility] [place] [facility] OR    Name: Stacie Richmond : 1960, Age: 63 y.o., MRN: [id number], Sex: female    Diagnosis  Pre-op Diagnosis      * Proliferative diabetic retinopathy of both eyes with macular edema associated with type 2 diabetes mellitus [E11.3513] Post-op Diagnosis     * Proliferative diabetic retinopathy of left eye without macular edema associated with type 2 diabetes mellitus [E11.3592]     * Vitreous hemorrhage of left eye (Multi) [H43.12]     Procedures  Vitrectomy Pars Plana w/Endolaser  59564 - [place] VTRECTOMY MCHNL PARS PLNA ENDOLASER PANRTA PC      Surgeons      * Jam Morales - Primary    Resident/Fellow/Other Assistant:  Surgeons and Role:     * Thomas Alcala MD - Assisting    Staff:   Circulator: Alee Hernandez Person: Clare    Anesthesia Staff: Anesthesiologist: David Fisher MD  Anesthesia Resident: Kalin Metcalf DO    Procedure Summary  Anesthesia: Monitor Anesthesia Care  ASA: III  Estimated Blood Loss: 0 mL  Intra-op Medications:   Administrations occurring from 1130 to 1330 on 24:   Medication Name Total Dose   ceFAZolin (Ancef) injection 50 mg   balanced salts (BSS) intraocular solution 15 mL   balanced salts (BSS Plus) intraocular solution 500 mL   sterile water irrigation solution 100 mL   dexAMETHasone (Decadron) injection 5 mg   povidone-iodine 5 % ophthalmic solution 1 Application   bupivacaine PF (Marcaine) injection 0.75 % 3.5 mL   tobramycin-dexamethasone (Tobradex) ophthalmic ointment 0.5 inch   chondroitin sulf-sod hyaluron (Viscoat) intraocular injection 0.5 mL   fentaNYL (Sublimaze) injection 50 mcg/mL 75 mcg   midazolam (Versed) IV infusion 1 mg/mL 2 mg   propofol (Diprivan) injection 10 mg/mL 30 mg          Anesthesia Record               Intraprocedure I/O Totals          Intake    Midazolam 0.00 mL    The total shown is the total volume documented since Anesthesia Start was filed.     Total Intake 0 mL          Specimen: No specimens collected     Drains and/or Catheters: * None in log *    Tourniquet Times: n/a  Implants: n/a    Findings: vitreous hemorrhage, proliferative diabetic retinopathy- left eye    Indications: Stacie Richmond is an 63 y.o. female who is having surgery for Proliferative diabetic retinopathy of both eyes with macular edema associated with type 2 diabetes mellitus [E11.3513].     The patient was seen in the preoperative area. The risks, benefits, complications, treatment options, non-operative alternatives, expected recovery and outcomes were discussed with the patient. The possibilities of reaction to medication, pulmonary aspiration, injury to surrounding structures, bleeding, recurrent infection, the need for additional procedures, failure to diagnose a condition, and creating a complication requiring transfusion or operation were discussed with the patient. The patient concurred with the proposed plan, giving informed consent.  The site of surgery was properly noted/marked if necessary per policy. The patient has been actively warmed in preoperative area. Preoperative antibiotics are not indicated. Venous thrombosis prophylaxis are not indicated.    Procedure Details:     DATE OF SURGERY: 11/18/2024    SURGEON: Jam Morales MD    ASSISTANT: Thomas Alcala MD    PREOPERATIVE DIAGNOSIS  Pre-Op Diagnosis Codes:      * Proliferative diabetic retinopathy of both eyes with macular edema associated with type 2 diabetes mellitus [E11.3513]    POSTOPERATIVE DIAGNOSIS   Post-op Diagnosis     * Proliferative diabetic retinopathy of left eye without macular edema associated with type 2 diabetes mellitus [E11.3592]     * Vitreous hemorrhage of left eye (Multi) [H43.12]    OPERATION PERFORMED  25 -gauge pars plana vitrectomy, panretinal photocoagulation endolaser, partial air-fluid exchange to the left eye    ANESTHESIA  Monitored anesthesia care with a standard block consisting of a  1:1 mixture of 4 %  lidocaine and 0.75% Marcaine with Wydase     FINDINGS  As detailed below     COMPLICATIONS  None     ESTIMATED BLOOD LOSS   Minimal     SPECIMENS REMOVED  None     JUSTIFICATION  This is a 63 y.o. year old patient with vitreous hemorrhage, proliferative diabetic retinopathy- left eye. This was causing decreased visual function. The risks, benefits, and alternatives to the procedure were discussed with the patient including the risk for vision loss, blindness, retinal detachment, infection, bleeding, pain, high or low pressure in the eye, double vision, no benefit, need for additional procedures, and droopy eyelid, amongst others. The patient had a full opportunity to have all questions answered in clinic prior to surgery. Afterwards, the patient requested that we perform surgery and signed the consent form.     PROCEDURE:   The patient was brought to the preoperative holding area where the correct eye was confirmed and marked. The patient was then brought to the operating room where a secondary time-out was performed to identify the correct patient, eye, procedure, and any allergies. The patient then underwent intravenous sedation by the anesthesia team followed by a block as described above.  The eye was prepped and draped in the usual sterile ophthalmic fashion followed by placement of a lid speculum.     A  25 -gauge trocar was placed in the inferotemporal quadrant 4 mm posterior to the limbus after conjunctival displacement.  A 4 mm infusion cannula was placed through this trocar, and the infusion cannula was confirmed in the vitreous cavity prior to turning it on. Two additional  25 -gauge trocars were placed in a similar fashion in the superonasal and superotemporal quadrants 4 mm posterior to the limbus after conjunctival displacement.     At this time, a standard three-port pars plana vitrectomy was performed using the light pipe, the cutter, and the BIOM viewing system. A core vitreous  dissection was performed and vitreous hemorrhage was evacuated. The retina was inspected and was found to be attached with evidence of neovascularization elsewhere (NVE) along the superior and temporal midperiphery. The patient had prior panretinal photocoagulation (PRP) therapy, however, there were areas of ischemic retina that had not been previously treated within the midperiphery and far periphery. A posterior vitreous detachment was performed with aspiration of the hyaloid over the optic nerve. A thorough peripheral vitreous dissection was performed and hemorrhagic debris was removed from the vitreous base. The endolaser probe was brought onto the field and was used to apply laser in PRP fashion to all areas of ischemic retina that had not been previously treated. Laser Settings: Power: 250mW, Duration: 100ms, Interval: 200ms, Shot Count: 759, Total Energy: 18.85 Joules. The peripheral retina was inspected with scleral depression and was found to be attached without any retinal holes, breaks or fluid.       A partial air-fluid exchange of approximately 30% was performed with the vitrector. The superonasal and superotemporal trocars were removed and were  air-tight . The infusion cannula and associated trocar were then removed and  was air-tight . The eye was confirmed to be at a physiologic level by digital palpation after removal of the trocars.     Subconjunctival injection of Cefazolin and Dexamethasone were administered. The lid speculum and drapes were removed. Antibiotic ointment was applied. The eye was patched and shielded. The patient tolerated the procedure well without any intraoperative or immediate postoperative complications. The patient was taken to the recovery room in good condition. The patient will follow up in clinic on the following morning.      Complications:  None; patient tolerated the procedure well.    Disposition: PACU - hemodynamically stable.  Condition: stable     Additional  Details:     A qualified resident physician was not available and the use of a skilled assistant surgeon, Thomas Alcala MD, was required for the following portions of this case: pars plana vitrectomy, endolaser, partial air-fluid exchange - LEFT EYE    Attending Attestation:     Jam Morales  Phone Number: [phone]

## 2024-11-18 NOTE — H&P
History Of Present Illness  Stacie Richmond is a 63 y.o. female presenting with vitreous hemorrhage and proliferative diabetic retinopathy - left eye. This has caused significant visual decline in left eye.     Past Medical History  She has a past medical history of Band keratopathy of both eyes, Cataract, COVID-19, Diabetic retinopathy (Multi), HL (hearing loss), Hyperlipidemia, Hypertension, Personal history of other endocrine, nutritional and metabolic disease (2022), Personal history of other endocrine, nutritional and metabolic disease (2022), Rosacea blepharoconjunctivitis, Skin disorder, Type 2 diabetes mellitus, and Unspecified corneal ulcer, unspecified eye (2014).    Surgical History  She has a past surgical history that includes  section, classic (2015); Cholecystectomy (2015);  section, low transverse; and Eye surgery.     Social History  She reports that she has never smoked. She has never been exposed to tobacco smoke. She has never used smokeless tobacco. She reports that she does not currently use alcohol. She reports that she does not use drugs.     Allergies  Patient has no known allergies.    ROS  Patient denies ocular pain, redness, discharge, decreased vision, double vision, blind spots, flashes, or floaters.     Physical Exam   Alert & Oriented x 3       Assessment/Plan   Assessment & Plan  Proliferative diabetic retinopathy of both eyes with macular edema associated with type 2 diabetes mellitus      Will plan to proceed today with pars plana vitrectomy, endolaser, with possible air-fluid exchange - LEFT EYE       I spent 10 minutes in the professional and overall care of this patient.      Thomas Alcala MD

## 2024-11-19 ENCOUNTER — OFFICE VISIT (OUTPATIENT)
Dept: OPHTHALMOLOGY | Facility: CLINIC | Age: 64
End: 2024-11-19
Payer: COMMERCIAL

## 2024-11-19 DIAGNOSIS — E11.3513 PROLIFERATIVE DIABETIC RETINOPATHY OF BOTH EYES WITH MACULAR EDEMA ASSOCIATED WITH TYPE 2 DIABETES MELLITUS: Primary | ICD-10-CM

## 2024-11-19 DIAGNOSIS — Z98.890 H/O VITRECTOMY: ICD-10-CM

## 2024-11-19 DIAGNOSIS — H43.12 VITREOUS HEMORRHAGE OF LEFT EYE (MULTI): ICD-10-CM

## 2024-11-19 PROCEDURE — 99024 POSTOP FOLLOW-UP VISIT: CPT | Performed by: OPHTHALMOLOGY

## 2024-11-19 ASSESSMENT — TONOMETRY
IOP_METHOD: GOLDMANN APPLANATION
OS_IOP_MMHG: 9

## 2024-11-19 ASSESSMENT — ENCOUNTER SYMPTOMS
CARDIOVASCULAR NEGATIVE: 0
CONSTITUTIONAL NEGATIVE: 0
NEUROLOGICAL NEGATIVE: 0
HEMATOLOGIC/LYMPHATIC NEGATIVE: 0
ALLERGIC/IMMUNOLOGIC NEGATIVE: 0
EYES NEGATIVE: 1
MUSCULOSKELETAL NEGATIVE: 0
GASTROINTESTINAL NEGATIVE: 0
RESPIRATORY NEGATIVE: 0
PSYCHIATRIC NEGATIVE: 0
ENDOCRINE NEGATIVE: 0

## 2024-11-19 ASSESSMENT — EXTERNAL EXAM - LEFT EYE: OS_EXAM: NORMAL

## 2024-11-19 ASSESSMENT — VISUAL ACUITY
OS_SC: CF @1FT
METHOD: SNELLEN - LINEAR

## 2024-11-19 ASSESSMENT — SLIT LAMP EXAM - LIDS: COMMENTS: NORMAL

## 2024-11-19 NOTE — PROGRESS NOTES
Assessment/Plan   Diagnoses and all orders for this visit:  Proliferative diabetic retinopathy of both eyes with macular edema associated with type 2 diabetes mellitus  Vitreous hemorrhage of left eye (Multi)  H/O vitrectomy    #PDR w vitreous (vit) heme OS  POD#1 s/p pars plana vitrectomy (PPV), endolaser (EL), partial air fluid exchange OS 11/18/24  -pt doing well, VA CF@1', IOP 9, anterior chamber (AC) formed, mixed cell   -start ofloxacin QID OS   -start prednisolone QID OS   -continue wearing shield over eye at night   -no bending, heavy lifting, straining   -continue positioning face down, on side, or upright without laying on back for the next 5 days

## 2024-11-23 DIAGNOSIS — L71.9 ROSACEA: ICD-10-CM

## 2024-11-25 RX ORDER — MINOCYCLINE HYDROCHLORIDE 100 MG/1
100 CAPSULE ORAL DAILY
Qty: 90 CAPSULE | Refills: 0 | Status: SHIPPED | OUTPATIENT
Start: 2024-11-25

## 2024-12-01 NOTE — PROGRESS NOTES
Imaging    Macula OCT 12/03/24  Right eye (OD): temporal DME with IRF, preserved foveal contour  Left eye (OS): very poor scan quality appears grossly normal    Assessment/Plan   Diagnoses and all orders for this visit:    Proliferative diabetic retinopathy of both eyes with macular edema associated with type 2 diabetes mellitus  Vitreous hemorrhage of left eye (Multi)  H/O vitrectomy  -Initially presented with VH OS, had PRP #1 OS 8/5/24. VH OS was resolving, but developed new VH OD 9/19/25 and recurrent VH OS 9/25/24.  -VH OS was self-resolving, but due to persistent VH OD underwent PPV/EL OD 10/10/24  -Had some DME OD afterwards but patient deferred antiVEGF  -Due to persistent partially resolved VH OS, underwent PPV/EL OS 11/18/24  -POW#2 s/p pars plana vitrectomy (PPV), endolaser (EL), partial air fluid exchange OS 11/18/24  -Today, VA improved OS but still blurry, 20/60 OS today  -No pain  -mild residual heme OS -improving  -Mild cataracts, may be progressing after vitrectomies  -D/c oflox, taper PF  -Follow up 3 weeks, plan for new Mrx in about 1.5 -2 mon provided VH OS settles, if new Mrx not improving VA consider cataract eval

## 2024-12-02 ENCOUNTER — APPOINTMENT (OUTPATIENT)
Dept: OPHTHALMOLOGY | Facility: CLINIC | Age: 64
End: 2024-12-02
Payer: COMMERCIAL

## 2024-12-02 DIAGNOSIS — H43.12 VITREOUS HEMORRHAGE OF LEFT EYE (MULTI): ICD-10-CM

## 2024-12-02 DIAGNOSIS — E11.3513 PROLIFERATIVE DIABETIC RETINOPATHY OF BOTH EYES WITH MACULAR EDEMA ASSOCIATED WITH TYPE 2 DIABETES MELLITUS: Primary | ICD-10-CM

## 2024-12-02 PROCEDURE — 99024 POSTOP FOLLOW-UP VISIT: CPT | Performed by: OPHTHALMOLOGY

## 2024-12-02 PROCEDURE — 92134 CPTRZ OPH DX IMG PST SGM RTA: CPT | Performed by: OPHTHALMOLOGY

## 2024-12-02 ASSESSMENT — VISUAL ACUITY
OD_SC+: -2
OS_SC: 20/60
METHOD: SNELLEN - LINEAR
OD_SC: 20/30
OS_SC+: -2

## 2024-12-02 ASSESSMENT — ENCOUNTER SYMPTOMS
RESPIRATORY NEGATIVE: 0
HEMATOLOGIC/LYMPHATIC NEGATIVE: 0
GASTROINTESTINAL NEGATIVE: 0
PSYCHIATRIC NEGATIVE: 0
NEUROLOGICAL NEGATIVE: 0
ENDOCRINE NEGATIVE: 0
CARDIOVASCULAR NEGATIVE: 0
MUSCULOSKELETAL NEGATIVE: 0
CONSTITUTIONAL NEGATIVE: 0
ALLERGIC/IMMUNOLOGIC NEGATIVE: 0
EYES NEGATIVE: 1

## 2024-12-02 ASSESSMENT — EXTERNAL EXAM - LEFT EYE: OS_EXAM: NORMAL

## 2024-12-02 ASSESSMENT — SLIT LAMP EXAM - LIDS: COMMENTS: NORMAL

## 2024-12-02 NOTE — LETTER
December 2, 2024     Patient: Stacie Richmond   YOB: 1960   Date of Visit: 12/2/2024       To Whom It May Concern:    It is my medical opinion that Stacie Richmond may return to work on 12/16/24 .    If you have any questions or concerns, please don't hesitate to call.         Sincerely,        Jam Morales MD    CC: No Recipients

## 2024-12-03 ASSESSMENT — SLIT LAMP EXAM - LIDS: COMMENTS: 3+ MGD WITH TOOTHPASTE SECRETIONS

## 2024-12-03 ASSESSMENT — EXTERNAL EXAM - RIGHT EYE: OD_EXAM: SKIN  - ROSACEA

## 2024-12-03 ASSESSMENT — CUP TO DISC RATIO: OD_RATIO: .4

## 2024-12-06 ENCOUNTER — APPOINTMENT (OUTPATIENT)
Dept: RADIOLOGY | Facility: CLINIC | Age: 64
End: 2024-12-06
Payer: COMMERCIAL

## 2024-12-26 ENCOUNTER — APPOINTMENT (OUTPATIENT)
Dept: OPHTHALMOLOGY | Facility: CLINIC | Age: 64
End: 2024-12-26
Payer: COMMERCIAL

## 2024-12-26 DIAGNOSIS — E11.3513 PROLIFERATIVE DIABETIC RETINOPATHY OF BOTH EYES WITH MACULAR EDEMA ASSOCIATED WITH TYPE 2 DIABETES MELLITUS: Primary | ICD-10-CM

## 2024-12-26 DIAGNOSIS — H43.12 VITREOUS HEMORRHAGE OF LEFT EYE (MULTI): ICD-10-CM

## 2024-12-26 DIAGNOSIS — E11.3512 PROLIFERATIVE DIABETIC RETINOPATHY OF LEFT EYE WITH MACULAR EDEMA ASSOCIATED WITH TYPE 2 DIABETES MELLITUS: ICD-10-CM

## 2024-12-26 PROCEDURE — 92134 CPTRZ OPH DX IMG PST SGM RTA: CPT | Performed by: OPHTHALMOLOGY

## 2024-12-26 PROCEDURE — 99024 POSTOP FOLLOW-UP VISIT: CPT | Performed by: OPHTHALMOLOGY

## 2024-12-26 ASSESSMENT — CONF VISUAL FIELD
OD_SUPERIOR_TEMPORAL_RESTRICTION: 3
OS_SUPERIOR_NASAL_RESTRICTION: 0
OS_SUPERIOR_TEMPORAL_RESTRICTION: 0
OD_INFERIOR_NASAL_RESTRICTION: 3
OS_NORMAL: 1
OS_INFERIOR_TEMPORAL_RESTRICTION: 0
OD_INFERIOR_TEMPORAL_RESTRICTION: 3
OD_SUPERIOR_NASAL_RESTRICTION: 3
OS_INFERIOR_NASAL_RESTRICTION: 0

## 2024-12-26 ASSESSMENT — CUP TO DISC RATIO: OD_RATIO: .4

## 2024-12-26 ASSESSMENT — EXTERNAL EXAM - LEFT EYE: OS_EXAM: NORMAL

## 2024-12-26 ASSESSMENT — ENCOUNTER SYMPTOMS: EYES NEGATIVE: 1

## 2024-12-26 ASSESSMENT — VISUAL ACUITY
OS_SC: CF@2'
METHOD: SNELLEN - LINEAR
OD_SC: 20/25

## 2024-12-26 ASSESSMENT — TONOMETRY
OD_IOP_MMHG: 11
OS_IOP_MMHG: 13
IOP_METHOD: GOLDMANN APPLANATION

## 2024-12-26 ASSESSMENT — SLIT LAMP EXAM - LIDS
COMMENTS: NORMAL
COMMENTS: 3+ MGD WITH TOOTHPASTE SECRETIONS

## 2024-12-26 ASSESSMENT — EXTERNAL EXAM - RIGHT EYE: OD_EXAM: SKIN  - ROSACEA

## 2024-12-26 NOTE — PROGRESS NOTES
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 VH OS was resolving, but developed new VH OD 9/19/25 and recurrent VH OS 9/25/24.  -VH OS was self-resolving, but due to persistent VH OD underwent PPV/EL OD 10/10/24  -Had some DME OD afterwards but patient deferred antiVEGF  -Due to persistent partially resolved VH OS, underwent PPV/EL OS 11/18/24  -POW#2 s/p pars plana vitrectomy (PPV), endolaser (EL), partial air fluid exchange OS 11/18/24  -Today, VA improved OS but still blurry, 20/60 OS today  -No pain  -mild residual heme OS -improving  -Mild cataracts, may be progressing after vitrectomies  -D/c oflox, taper PF  -Follow up 3 weeks, plan for new Mrx in about 1.5 -2 mon provided VH OS settles, if new Mrx not improving VA consider cataract eval

## 2025-01-09 ENCOUNTER — APPOINTMENT (OUTPATIENT)
Dept: OPHTHALMOLOGY | Facility: CLINIC | Age: 65
End: 2025-01-09
Payer: COMMERCIAL

## 2025-01-16 ENCOUNTER — APPOINTMENT (OUTPATIENT)
Dept: OPHTHALMOLOGY | Facility: CLINIC | Age: 65
End: 2025-01-16
Payer: COMMERCIAL

## 2025-01-16 DIAGNOSIS — E11.3513 PROLIFERATIVE DIABETIC RETINOPATHY OF BOTH EYES WITH MACULAR EDEMA ASSOCIATED WITH TYPE 2 DIABETES MELLITUS: Primary | ICD-10-CM

## 2025-01-16 DIAGNOSIS — H43.12 VITREOUS HEMORRHAGE OF LEFT EYE (MULTI): ICD-10-CM

## 2025-01-16 PROCEDURE — 99024 POSTOP FOLLOW-UP VISIT: CPT | Performed by: OPHTHALMOLOGY

## 2025-01-16 ASSESSMENT — TONOMETRY
IOP_METHOD: GOLDMANN APPLANATION
OD_IOP_MMHG: 14
OS_IOP_MMHG: 11

## 2025-01-16 ASSESSMENT — VISUAL ACUITY
METHOD: SNELLEN - LINEAR
OD_SC: 20/30
OS_SC+: -1
OS_SC: 20/50

## 2025-01-16 NOTE — PROGRESS NOTES
Proliferative diabetic retinopathy of both eyes with macular edema associated with type 2 diabetes mellitus  Vitreous hemorrhage of left eye (Multi)    Seen 12/26/24    Today, VH continues to iumprove OS - mild haziness  Mild limitation in BCVA today OS>OD - residual VH likely contributing, but DME, referactive error, and mild cataracts could also be contributory  Plan for follow up 1 month to ensure VH OS resolves - if BCVA still limited and has DME in either eye, could consider initiation of antiVEGF injections   If DME minimal to absent but BCVA still limited, consider refraction +/- cataract eval    PRIOR COURSE:   -Initially presented with VH OS, had PRP #1 OS 8/5/24. VH OS was resolving, but developed new VH OD 9/19/25 and recurrent VH OS 9/25/24.  -VH OS was self-resolving, but due to persistent VH OD underwent PPV/EL OD 10/10/24  -Had some DME OD afterwards but patient deferred antiVEGF  -Due to persistent partially resolved VH OS, underwent PPV/EL OS 11/18/24  -POW#2 s/p pars plana vitrectomy (PPV), endolaser (EL), partial air fluid exchange OS 11/18/24  -Today, VA improved OS but still blurry, 20/60 OS today  -No pain  -mild residual heme OS -improving  -Mild cataracts, may be progressing after vitrectomies  -D/c oflox, taper PF  -Follow up 3 weeks, plan for new Mrx in about 1.5 -2 mon provided VH OS settles, if new Mrx not improving VA consider cataract eval

## 2025-01-17 ENCOUNTER — TELEPHONE (OUTPATIENT)
Facility: CLINIC | Age: 65
End: 2025-01-17

## 2025-01-17 ENCOUNTER — APPOINTMENT (OUTPATIENT)
Facility: CLINIC | Age: 65
End: 2025-01-17
Payer: COMMERCIAL

## 2025-01-17 VITALS
OXYGEN SATURATION: 97 % | SYSTOLIC BLOOD PRESSURE: 136 MMHG | RESPIRATION RATE: 18 BRPM | BODY MASS INDEX: 28.87 KG/M2 | HEART RATE: 76 BPM | TEMPERATURE: 98.2 F | HEIGHT: 59 IN | DIASTOLIC BLOOD PRESSURE: 80 MMHG | WEIGHT: 143.2 LBS

## 2025-01-17 DIAGNOSIS — J01.90 ACUTE BACTERIAL SINUSITIS: Primary | ICD-10-CM

## 2025-01-17 DIAGNOSIS — Z00.00 HEALTHCARE MAINTENANCE: Primary | ICD-10-CM

## 2025-01-17 DIAGNOSIS — B96.89 ACUTE BACTERIAL SINUSITIS: Primary | ICD-10-CM

## 2025-01-17 DIAGNOSIS — Z79.4 TYPE 2 DIABETES MELLITUS WITH HYPERGLYCEMIA, WITH LONG-TERM CURRENT USE OF INSULIN: ICD-10-CM

## 2025-01-17 DIAGNOSIS — E11.65 TYPE 2 DIABETES MELLITUS WITH HYPERGLYCEMIA, WITH LONG-TERM CURRENT USE OF INSULIN: ICD-10-CM

## 2025-01-17 LAB
POC APPEARANCE, URINE: CLEAR
POC BILIRUBIN, URINE: NEGATIVE
POC BLOOD, URINE: NEGATIVE
POC COLOR, URINE: YELLOW
POC GLUCOSE, URINE: ABNORMAL MG/DL
POC KETONES, URINE: NEGATIVE MG/DL
POC LEUKOCYTES, URINE: NEGATIVE
POC NITRITE,URINE: NEGATIVE
POC PH, URINE: 6 PH
POC PROTEIN, URINE: NEGATIVE MG/DL
POC SPECIFIC GRAVITY, URINE: 1.02
POC UROBILINOGEN, URINE: 0.2 EU/DL

## 2025-01-17 PROCEDURE — 3075F SYST BP GE 130 - 139MM HG: CPT | Performed by: FAMILY MEDICINE

## 2025-01-17 PROCEDURE — 1036F TOBACCO NON-USER: CPT | Performed by: FAMILY MEDICINE

## 2025-01-17 PROCEDURE — 99396 PREV VISIT EST AGE 40-64: CPT | Performed by: FAMILY MEDICINE

## 2025-01-17 PROCEDURE — 3008F BODY MASS INDEX DOCD: CPT | Performed by: FAMILY MEDICINE

## 2025-01-17 PROCEDURE — 3079F DIAST BP 80-89 MM HG: CPT | Performed by: FAMILY MEDICINE

## 2025-01-17 PROCEDURE — 4010F ACE/ARB THERAPY RXD/TAKEN: CPT | Performed by: FAMILY MEDICINE

## 2025-01-17 PROCEDURE — 81002 URINALYSIS NONAUTO W/O SCOPE: CPT | Performed by: FAMILY MEDICINE

## 2025-01-17 PROCEDURE — 93000 ELECTROCARDIOGRAM COMPLETE: CPT | Performed by: FAMILY MEDICINE

## 2025-01-17 RX ORDER — SEMAGLUTIDE 2.68 MG/ML
2 INJECTION, SOLUTION SUBCUTANEOUS
Qty: 9 ML | Refills: 3 | Status: SHIPPED | OUTPATIENT
Start: 2025-01-19 | End: 2026-01-19

## 2025-01-17 RX ORDER — AZITHROMYCIN 250 MG/1
TABLET, FILM COATED ORAL
Qty: 6 TABLET | Refills: 0 | Status: SHIPPED | OUTPATIENT
Start: 2025-01-17 | End: 2025-01-21

## 2025-01-17 RX ORDER — INSULIN DEGLUDEC 100 U/ML
25 INJECTION, SOLUTION SUBCUTANEOUS EVERY MORNING
Qty: 22.5 ML | Refills: 3 | Status: SHIPPED | OUTPATIENT
Start: 2025-01-17 | End: 2026-01-17

## 2025-01-17 ASSESSMENT — ENCOUNTER SYMPTOMS
NEUROLOGICAL NEGATIVE: 1
ENDOCRINE NEGATIVE: 1
CARDIOVASCULAR NEGATIVE: 1
CONSTITUTIONAL NEGATIVE: 1
RESPIRATORY NEGATIVE: 1
EYES NEGATIVE: 1
PSYCHIATRIC NEGATIVE: 1
GASTROINTESTINAL NEGATIVE: 1

## 2025-01-17 ASSESSMENT — PATIENT HEALTH QUESTIONNAIRE - PHQ9
SUM OF ALL RESPONSES TO PHQ9 QUESTIONS 1 AND 2: 0
1. LITTLE INTEREST OR PLEASURE IN DOING THINGS: NOT AT ALL
2. FEELING DOWN, DEPRESSED OR HOPELESS: NOT AT ALL

## 2025-01-17 NOTE — TELEPHONE ENCOUNTER
Per pt's VM left on 's VM, her antibiotic is not at the pharmacy. She is asking if it can be sent as soon as possible.

## 2025-01-17 NOTE — PROGRESS NOTES
"Subjective     Patient ID: Stacie Richmond is a 64 y.o. female who presents for Annual Exam.      Review of Systems   Constitutional: Negative.    HENT: Negative.     Eyes: Negative.    Respiratory: Negative.     Cardiovascular: Negative.    Gastrointestinal: Negative.    Endocrine: Negative.    Genitourinary: Negative.    Skin: Negative.    Neurological: Negative.    Psychiatric/Behavioral: Negative.         Objective     Vitals:    01/17/25 1026   BP: 136/80   BP Location: Right arm   Patient Position: Sitting   Pulse: 76   Resp: 18   Temp: 36.8 °C (98.2 °F)   SpO2: 97%   Weight: 65 kg (143 lb 3.2 oz)   Height: 1.499 m (4' 11\")        Current Outpatient Medications   Medication Instructions    blood sugar diagnostic (Blood Glucose Test) strip 1 each, miscellaneous, 4 times daily, Use to check glucose 4 times daily, before meals and at bedtime    blood-glucose meter,continuous (Dexcom G7 ) misc Use as instructed    blood-glucose sensor (Dexcom G7 Sensor) device Use to check glucose, change every 10 days    ergocalciferol (Vitamin D-2) 1.25 MG (22236 UT) capsule Take by mouth.    insulin aspart, with niacinamide, (Fiasp FlexTouch U-100 Insulin) 100 unit/mL (3 mL) injection 8 Units, subcutaneous, 3 times daily (morning, midday, late afternoon)    lancets misc 1 each, miscellaneous, 4 times daily, Use to check glucose 4 times daily, before meals and at bedtime    lisinopril 10 mg, oral, Daily    metFORMIN (GLUCOPHAGE) 1,000 mg, oral, 2 times daily    metroNIDAZOLE (Metrocream) 0.75 % cream 1 Application, Topical, Daily    minocycline 100 mg, oral, Daily    [START ON 1/19/2025] Ozempic 2 mg, subcutaneous, Once Weekly    pen needle, diabetic 32 gauge x 5/32\" needle 1 each, miscellaneous, 4 times daily, Use to inject insulin 4 times daily    Tresiba FlexTouch U-100 25 Units, subcutaneous, Every morning        Physical Exam  Constitutional:       General: She is not in acute distress.     Appearance: Normal " appearance.   HENT:      Head: Normocephalic and atraumatic.      Right Ear: Tympanic membrane normal.      Left Ear: Tympanic membrane normal.      Nose: Nose normal.      Mouth/Throat:      Pharynx: Oropharynx is clear.   Eyes:      Conjunctiva/sclera: Conjunctivae normal.      Pupils: Pupils are equal, round, and reactive to light.   Neck:      Vascular: No carotid bruit.   Cardiovascular:      Rate and Rhythm: Normal rate and regular rhythm.      Heart sounds: Normal heart sounds.   Pulmonary:      Effort: Pulmonary effort is normal.      Breath sounds: Normal breath sounds.   Abdominal:      General: Bowel sounds are normal.      Palpations: Abdomen is soft.   Musculoskeletal:      Cervical back: Neck supple. No tenderness.   Skin:     General: Skin is warm and dry.   Neurological:      General: No focal deficit present.      Mental Status: She is alert.   Psychiatric:         Mood and Affect: Mood normal.         Behavior: Behavior normal.         Assessment/Plan   Diagnoses and all orders for this visit:  Healthcare maintenance  -     POCT UA (nonautomated) manually resulted  -     ECG 12 lead (Clinic Performed)  -     CBC; Future  -     Comprehensive Metabolic Panel; Future  -     Lipid Panel; Future  Type 2 diabetes mellitus with hyperglycemia, with long-term current use of insulin  -     Tresiba FlexTouch U-100 100 unit/mL (3 mL) injection; Inject 25 Units under the skin once daily in the morning.  -     semaglutide (Ozempic) 2 mg/dose (8 mg/3 mL) pen injector; Inject 2 mg under the skin 1 (one) time per week.         Has appointment with Endocrinology next month

## 2025-01-20 ASSESSMENT — SLIT LAMP EXAM - LIDS
COMMENTS: NORMAL
COMMENTS: 3+ MGD WITH TOOTHPASTE SECRETIONS

## 2025-01-20 ASSESSMENT — EXTERNAL EXAM - LEFT EYE: OS_EXAM: NORMAL

## 2025-01-20 ASSESSMENT — CUP TO DISC RATIO: OD_RATIO: .4

## 2025-01-20 ASSESSMENT — EXTERNAL EXAM - RIGHT EYE: OD_EXAM: SKIN  - ROSACEA

## 2025-02-05 ENCOUNTER — APPOINTMENT (OUTPATIENT)
Dept: OPHTHALMOLOGY | Facility: CLINIC | Age: 65
End: 2025-02-05
Payer: COMMERCIAL

## 2025-02-11 DIAGNOSIS — Z79.4 TYPE 2 DIABETES MELLITUS WITH HYPERGLYCEMIA, WITH LONG-TERM CURRENT USE OF INSULIN: ICD-10-CM

## 2025-02-11 DIAGNOSIS — E11.65 TYPE 2 DIABETES MELLITUS WITH HYPERGLYCEMIA, WITH LONG-TERM CURRENT USE OF INSULIN: ICD-10-CM

## 2025-02-24 RX ORDER — SEMAGLUTIDE 2.68 MG/ML
2 INJECTION, SOLUTION SUBCUTANEOUS
Qty: 3 ML | Refills: 3 | Status: SHIPPED | OUTPATIENT
Start: 2025-03-02 | End: 2025-02-28 | Stop reason: ALTCHOICE

## 2025-02-27 ENCOUNTER — APPOINTMENT (OUTPATIENT)
Dept: OPHTHALMOLOGY | Facility: CLINIC | Age: 65
End: 2025-02-27
Payer: COMMERCIAL

## 2025-02-27 NOTE — PROGRESS NOTES
"Subjective   Stacie Richmond is a 64 y.o. female presents today for a follow up of Type 2 diabetes.   Known complications include:     Patient of IRIS Schultz and last seen by her 6/2024   This is the first visit with me   A1c >14%.  Previous A1c 12.3% in 6/2024.     Since last visit, she has been off ozempic    Insurance not covering her ozempic   Has been without about 6 weeks    Reports never taking mealtime insulin      Current diabetes regimen is as follows:   Metformin 1000 mg twice daily with meals   Ozempic 2 mg once weekly - has been off for 6 weeks    Tresiba 25 units once daily morning      Patient is not using continuous glucose monitor - cannot afford  The patient is currently checking the blood glucose 1 time per day  at most  Was having this checked at work or using her sister's meter  No data today      Hypoglycemia frequency: Denies  Hypoglycemia awareness: yes     Regarding symptoms of hyperglycemia, the patient is experiencing symptoms such as polydipsia, polyuria, nocturia, and fatigue . The patient comes into the office today hyperglycemia.        ROS  General: no fever, chills or acute changes in weight in the last 6 months  Skin: no rashes, pruritis or dry skin  Cardiac: denies chest pain, heart palpitations or orthopnea  Pulmonary: denies wheezing, productive cough or exertional dyspnea      Objective    Physical Exam  Blood pressure 143/86, pulse 76, temperature 35.7 °C (96.3 °F), temperature source Temporal, height 1.499 m (4' 11\"), weight 64.5 kg (142 lb 3.2 oz).  General: not in acute distress, cooperative   Respiratory: normal respiratory effort  Musculoskeletal: normal gait       Current Outpatient Medications:     blood sugar diagnostic (Blood Glucose Test) strip, 1 each 4 times a day. Use to check glucose 4 times daily, before meals and at bedtime, Disp: 400 strip, Rfl: 3    blood-glucose meter,continuous (Dexcom G7 ) misc, Use as instructed, Disp: 1 each, Rfl: 0    " "blood-glucose sensor (Dexcom G7 Sensor) device, Use to check glucose, change every 10 days, Disp: 3 each, Rfl: 11    ergocalciferol (Vitamin D-2) 1.25 MG (86298 UT) capsule, Take by mouth. (Patient not taking: Reported on 11/18/2024), Disp: , Rfl:     insulin aspart, with niacinamide, (Fiasp FlexTouch U-100 Insulin) 100 unit/mL (3 mL) injection, Inject 8 Units under the skin 3 times a day with meals. (Patient not taking: Reported on 11/18/2024), Disp: 21.6 mL, Rfl: 3    lancets misc, 1 each 4 times a day. Use to check glucose 4 times daily, before meals and at bedtime (Patient not taking: Reported on 11/18/2024), Disp: 400 each, Rfl: 3    lisinopril 10 mg tablet, Take 1 tablet (10 mg) by mouth once daily., Disp: 90 tablet, Rfl: 3    metFORMIN (Glucophage) 1,000 mg tablet, Take 1 tablet (1,000 mg) by mouth 2 times a day., Disp: 180 tablet, Rfl: 3    metroNIDAZOLE (Metrocream) 0.75 % cream, Apply 1 Application topically once daily. (Patient not taking: Reported on 11/18/2024), Disp: 45 g, Rfl: 11    minocycline 100 mg capsule, TAKE 1 CAPSULE BY MOUTH ONCE DAILY., Disp: 90 capsule, Rfl: 0    pen needle, diabetic 32 gauge x 5/32\" needle, 1 each 4 times a day. Use to inject insulin 4 times daily (Patient not taking: Reported on 11/18/2024), Disp: 400 each, Rfl: 3    [START ON 3/2/2025] semaglutide (Ozempic) 2 mg/dose (8 mg/3 mL) pen injector, INJECT 2 MG SUBCUTANEOUSLY WEEKLY, Disp: 3 mL, Rfl: 3    Tresiba FlexTouch U-100 100 unit/mL (3 mL) injection, Inject 25 Units under the skin once daily in the morning., Disp: 22.5 mL, Rfl: 3    Lab Results   Component Value Date    BILITOT 0.4 01/17/2024    CALCIUM 9.2 01/17/2024    CO2 28 01/17/2024     01/17/2024    CREATININE 0.48 (L) 01/17/2024    GLUCOSE 106 (H) 01/17/2024    ALKPHOS 140 (H) 01/17/2024    K 4.0 01/17/2024    PROT 6.1 (L) 01/17/2024     01/17/2024    AST 18 01/17/2024    ALT 21 01/17/2024    BUN 17 01/17/2024    ANIONGAP 14 01/17/2024    ALBUMIN 3.9 " "01/17/2024    GFRF >90 08/28/2023     Lab Results   Component Value Date    TRIG 203 (H) 01/17/2024    CHOL 142 01/17/2024    LDLCALC 45 01/17/2024    HDL 56.4 01/17/2024     Lab Results   Component Value Date    HGBA1C 12.3 (H) 06/04/2024    HGBA1C 14.0 (H) 01/17/2024    HGBA1C 12.8 (A) 04/26/2022       The 10-year ASCVD risk score (Danette CONNELLY, et al., 2019) is: 9.1%    Values used to calculate the score:      Age: 64 years      Sex: Female      Is Non- : No      Diabetic: Yes      Tobacco smoker: No      Systolic Blood Pressure: 136 mmHg      Is BP treated: Yes      HDL Cholesterol: 56.4 mg/dL      Total Cholesterol: 142 mg/dL      Assessment & Plan  Type 2 diabetes mellitus with hyperglycemia, with long-term current use of insulin    Orders:    insulin aspart, with niacinamide, (Fiasp FlexTouch U-100 Insulin) 100 unit/mL (3 mL) injection; Inject 5 units with each meal.  Give 2 units for every 50 over 150.  Max daily dose 51 units    Tresiba FlexTouch U-100 100 unit/mL (3 mL) injection; Inject 25 Units under the skin once daily in the morning.    pen needle, diabetic 32 gauge x 5/32\" needle; 1 each 4 times a day. Use to inject insulin 4 times daily    Follow Up In Endocrinology; Future    Follow up in Endocrinology Pharmacy; Future    blood-glucose meter misc; Use to check blood glucose 4 times per day    blood sugar diagnostic (Blood Glucose Test) strip; Check blood glucose 4 time per day    lancets 33 gauge misc; Use to check blood sugar 4 times per day    blood-glucose sensor (FreeStyle Tnia 3 Plus Sensor) device; Change every 15 days    Comment: A1c not at goal.  Has not been at goal for years.  She has been off ozempic for about 6 weeks.  Reports needing a new script from the provider.  Possibly needs PA?  Given how high blood sugars are, discussed need to start insulin with meals.  She is not currently using CGM because of cost.  She checks her sugar once daily at work or uses her " elmer glucometer.  She is willing to check more often and will send meter supplies to pharmacy.  Asked her to check her insurance about chronic disease management program for CGM cost.  Maybe this could be covered?  Long term she prefers the [place] office and will transition to Dr. Townsend.  Second, short term follow up with PharmD for additional adjustments.  Ideally was tolerating ozempic and would be best to maximize the dose again.        Plan:   Start Fiasp 5 units with breakfast, lunch, and dinner plus sliding scale if needed  If blood sugar is < 150 add 0 units   If blood sugar is 150-200 add 2 units   If blood sugar is 201-250 add 4 units  If blood sugar is 251-300 add 6 units   If blood sugar is 301-350 add 8 units   If blood sugar is 351-400 add 10 units   If blood sugar is 401-450 add 12 units  If blood sugar is over 450, call the office    Continue Tresiba 25 units once daily in the morning   Restart Ozempic 0.25 mg once weekly x 2 weeks then if you are tolerating increase to 0.5 mg once weekly dose    Follow up with Pharmacy in 2-4 weeks  (Monday or Friday)   Follow up with Dr. Townsend    If you are unable to  your medications, notify me sooner via my chart or call me  Call [facility] and see if they have a chronic disease management program for diabetes.  If so you can enroll and get Tina covered or meter covered

## 2025-02-28 ENCOUNTER — APPOINTMENT (OUTPATIENT)
Dept: ENDOCRINOLOGY | Facility: CLINIC | Age: 65
End: 2025-02-28
Payer: COMMERCIAL

## 2025-02-28 VITALS
BODY MASS INDEX: 28.67 KG/M2 | WEIGHT: 142.2 LBS | SYSTOLIC BLOOD PRESSURE: 143 MMHG | HEIGHT: 59 IN | HEART RATE: 76 BPM | TEMPERATURE: 96.3 F | DIASTOLIC BLOOD PRESSURE: 86 MMHG

## 2025-02-28 DIAGNOSIS — Z79.4 TYPE 2 DIABETES MELLITUS WITH HYPERGLYCEMIA, WITH LONG-TERM CURRENT USE OF INSULIN: Primary | ICD-10-CM

## 2025-02-28 DIAGNOSIS — E11.65 TYPE 2 DIABETES MELLITUS WITH HYPERGLYCEMIA, WITH LONG-TERM CURRENT USE OF INSULIN: Primary | ICD-10-CM

## 2025-02-28 LAB — POC HEMOGLOBIN A1C: 14 % (ref 4.2–6.5)

## 2025-02-28 PROCEDURE — 3008F BODY MASS INDEX DOCD: CPT | Performed by: NURSE PRACTITIONER

## 2025-02-28 PROCEDURE — 4010F ACE/ARB THERAPY RXD/TAKEN: CPT | Performed by: NURSE PRACTITIONER

## 2025-02-28 PROCEDURE — 3079F DIAST BP 80-89 MM HG: CPT | Performed by: NURSE PRACTITIONER

## 2025-02-28 PROCEDURE — 83036 HEMOGLOBIN GLYCOSYLATED A1C: CPT | Performed by: NURSE PRACTITIONER

## 2025-02-28 PROCEDURE — 1036F TOBACCO NON-USER: CPT | Performed by: NURSE PRACTITIONER

## 2025-02-28 PROCEDURE — 99214 OFFICE O/P EST MOD 30 MIN: CPT | Performed by: NURSE PRACTITIONER

## 2025-02-28 PROCEDURE — 3077F SYST BP >= 140 MM HG: CPT | Performed by: NURSE PRACTITIONER

## 2025-02-28 RX ORDER — IBUPROFEN 200 MG
CAPSULE ORAL
Qty: 400 EACH | Refills: 3 | Status: SHIPPED | OUTPATIENT
Start: 2025-02-28

## 2025-02-28 RX ORDER — SEMAGLUTIDE 0.68 MG/ML
0.5 INJECTION, SOLUTION SUBCUTANEOUS
COMMUNITY
Start: 2025-02-28

## 2025-02-28 RX ORDER — LANCETS 33 GAUGE
EACH MISCELLANEOUS
Qty: 400 EACH | Refills: 3 | Status: SHIPPED | OUTPATIENT
Start: 2025-02-28

## 2025-02-28 RX ORDER — PEN NEEDLE, DIABETIC 30 GX3/16"
1 NEEDLE, DISPOSABLE MISCELLANEOUS 4 TIMES DAILY
Qty: 400 EACH | Refills: 1 | Status: SHIPPED | OUTPATIENT
Start: 2025-02-28 | End: 2026-02-28

## 2025-02-28 RX ORDER — INSULIN ASPART INJECTION 100 [IU]/ML
INJECTION, SOLUTION SUBCUTANEOUS
Qty: 60 ML | Refills: 1 | Status: SHIPPED | OUTPATIENT
Start: 2025-02-28

## 2025-02-28 RX ORDER — INSULIN DEGLUDEC 100 U/ML
25 INJECTION, SOLUTION SUBCUTANEOUS EVERY MORNING
Qty: 30 ML | Refills: 1 | Status: SHIPPED | OUTPATIENT
Start: 2025-02-28 | End: 2026-02-28

## 2025-02-28 RX ORDER — BLOOD-GLUCOSE SENSOR
EACH MISCELLANEOUS
Qty: 6 EACH | Refills: 1 | Status: SHIPPED | OUTPATIENT
Start: 2025-02-28

## 2025-02-28 RX ORDER — DEXTROSE 4 G
TABLET,CHEWABLE ORAL
Qty: 1 EACH | Refills: 0 | Status: SHIPPED | OUTPATIENT
Start: 2025-02-28

## 2025-02-28 ASSESSMENT — ENCOUNTER SYMPTOMS
OCCASIONAL FEELINGS OF UNSTEADINESS: 0
LOSS OF SENSATION IN FEET: 1
DEPRESSION: 0

## 2025-02-28 NOTE — ASSESSMENT & PLAN NOTE
"  Orders:    insulin aspart, with niacinamide, (Fiasp FlexTouch U-100 Insulin) 100 unit/mL (3 mL) injection; Inject 5 units with each meal.  Give 2 units for every 50 over 150.  Max daily dose 51 units    Tresiba FlexTouch U-100 100 unit/mL (3 mL) injection; Inject 25 Units under the skin once daily in the morning.    pen needle, diabetic 32 gauge x 5/32\" needle; 1 each 4 times a day. Use to inject insulin 4 times daily    Follow Up In Endocrinology; Future    Follow up in Endocrinology Pharmacy; Future    blood-glucose meter misc; Use to check blood glucose 4 times per day    blood sugar diagnostic (Blood Glucose Test) strip; Check blood glucose 4 time per day    lancets 33 gauge misc; Use to check blood sugar 4 times per day    blood-glucose sensor (FreeStyle Tina 3 Plus Sensor) device; Change every 15 days    "

## 2025-02-28 NOTE — PATIENT INSTRUCTIONS
Start Fiasp 5 units with breakfast, lunch, and dinner plus sliding scale if needed  If blood sugar is < 150 add 0 units   If blood sugar is 150-200 add 2 units   If blood sugar is 201-250 add 4 units  If blood sugar is 251-300 add 6 units   If blood sugar is 301-350 add 8 units   If blood sugar is 351-400 add 10 units   If blood sugar is 401-450 add 12 units  If blood sugar is over 450, call the office      Continue Tresiba 25 units once daily in the morning     Restart Ozempic 0.25 mg once weekly x 2 weeks then if you are tolerating increase to 0.5 mg once weekly dose      Follow up with Pharmacy in 2-4 weeks  (Monday or Friday)     Follow up with Dr. Townsend      If you are unable to  your medications, notify me sooner via my chart or call me    Call [facility] and see if they have a chronic disease management program for diabetes.  If so you can enroll and get Tina covered or meter covered     Hui Mcclendon, APRN-CNP  [address]  [address]   [place] [address]  Phone: [phone]  Fax: [phone]

## 2025-03-01 DIAGNOSIS — L71.9 ROSACEA: ICD-10-CM

## 2025-03-03 RX ORDER — MINOCYCLINE HYDROCHLORIDE 100 MG/1
100 CAPSULE ORAL DAILY
Qty: 90 CAPSULE | Refills: 0 | OUTPATIENT
Start: 2025-03-03

## 2025-03-27 ENCOUNTER — APPOINTMENT (OUTPATIENT)
Dept: OPHTHALMOLOGY | Facility: CLINIC | Age: 65
End: 2025-03-27
Payer: COMMERCIAL

## 2025-03-31 ENCOUNTER — APPOINTMENT (OUTPATIENT)
Dept: ENDOCRINOLOGY | Facility: CLINIC | Age: 65
End: 2025-03-31
Payer: COMMERCIAL

## 2025-04-26 NOTE — PROGRESS NOTES
Endocrinology  04/28/25DM@ for aprox 25 years/ PCP is Dr Sun/Does not see podiatry but sees an eye doctor often/ tests sugars TID   Lab Results   Component Value Date    HGBA1C 13.9 (A) 04/28/2025        History of Present Illness   Stacie Richmond is a 64 y.o. year old female with medical history of T2DM and HTN, here for T2DM management.     Patient saw Hui Mcclendon CNP in 2/2025 and prior to that saw Regi Schultz PA-C.     At her 2/2025 appt she informed provider that she was unable to afford CGM. She was encouraged to reach out to [facility] to see if there was chronic disease management programs for financial assistance.     Today the patient states, she has noticed polyuria and polydipsia concurrent with hyperglycemia.     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       Diabetes Summary   Diagnosis Date:       2002     Glucose Meter: has a meter at home  Diabetes Tech (CGM or pump):        has not used yet      Eye Exam: known proliferative diabetic retinopathy (follows w/ Dr. Morales)  Foot Exam:  completed 4/28/25 no neuropathy                Dental exam:      >18 months ago            Diet:           L: around 2P, sandwich  D: cooks a traditional dinner of protein, salad, noodles around 6P  Doesn't eat at work overnight  B: scrambled eggs w/ sausage or vaughan         Her current regimen is as follows:   Orals: metformin 1000 mg BID                  Injectables: ozempic 1 mg weekly (last dose was Monday 4/21; was getting samples from [facility])  Insulin: Tresiba 25u, fiasp 5u TIDAC + 1:50>150  Hx of pancreatitis: denies  Hx of medullary thyroid cancer: denies  Complications: retinopathy  Last episode of DKA or hyperosmolar coma:  denies    BG Evaluation  Home blood glucose log: did not bring meter or log to clinic today; reported from memory  Frequency of BG checks:  Fasting values: 200-240  Non-fasting values: 190-200    Hypoglycemia:  Frequency and timing of hypoglycemia: denies  Time of  "day hypoglycemia usually occurs:  Severity of hypoglycemic episode: unknown  Hypoglycemic threshold: unknown  Hypoglycemia symptoms: unknown  Last call to EMS or hospitalization: denies    Review of Systems   General: Denies fever or chills   Head: Denies headache or vision changes   Neck: Denies tenderness or lumps   Cardiac: Denies chest pain or palpitations   Respiratory: Denies shortness of breath or cough   GI: Denies abdominal pain, nausea, or vomiting   Extremities: Denies swelling   Skin: Denies rash     Medications     Current Outpatient Medications   Medication Instructions    blood sugar diagnostic (Blood Glucose Test) strip Check blood glucose 4 time per day    blood-glucose meter misc Use to check blood glucose 4 times per day    blood-glucose sensor (FreeStyle Tina 3 Plus Sensor) device Change every 15 days    insulin aspart, with niacinamide, (Fiasp FlexTouch U-100 Insulin) 100 unit/mL (3 mL) injection Inject 5 units with each meal.  Give 2 units for every 50 over 150.  Max daily dose 51 units    lancets 33 gauge misc Use to check blood sugar 4 times per day    lisinopril 10 mg, oral, Daily    metFORMIN (GLUCOPHAGE) 1,000 mg, oral, 2 times daily    metroNIDAZOLE (Metrocream) 0.75 % cream 1 Application, Topical, Daily    minocycline 100 mg, oral, Daily    Ozempic 0.5 mg, Every 7 days    pen needle, diabetic 32 gauge x 5/32\" needle 1 each, miscellaneous, 4 times daily, Use to inject insulin 4 times daily    Tresiba FlexTouch U-100 25 Units, subcutaneous, Every morning        History   Medical History[1]    Surgical History[2]    Family History[3]     RX Allergies[4]         Physical Exam   /74   Ht 1.499 m (4' 11\")   Wt 64 kg (141 lb)   BMI 28.48 kg/m²   General: Well appearing, no acute distress  Neuro: alert and oriented  Heart: Normal rate and rhythm  Lungs: CTAB no RWR  Extremities: Warm, 1+ edema b/l  Skin: No rashes    Labs and Imaging     Lab Results   Component Value Date    HGBA1C " 13.9 (A) 04/28/2025    HGBA1C 14 (A) 02/28/2025    HGBA1C 12.3 (H) 06/04/2024     01/17/2024    K 4.0 01/17/2024     01/17/2024    CO2 28 01/17/2024    BUN 17 01/17/2024    CREATININE 0.48 (L) 01/17/2024    CALCIUM 9.2 01/17/2024    ALBUMIN 3.9 01/17/2024    PROT 6.1 (L) 01/17/2024    BILITOT 0.4 01/17/2024    ALKPHOS 140 (H) 01/17/2024    ALT 21 01/17/2024    AST 18 01/17/2024    GLUCOSE 106 (H) 01/17/2024    CHOL 142 01/17/2024    TRIG 203 (H) 01/17/2024    HDL 56.4 01/17/2024       Assessment and Plan   Stacie Richmond is a 64 y.o. year old female with medical history of T2DM and HTN, here for T2DM management.     Uncontrolled T2DM. No log/meter today so fairly conservative changes in insulin regimen. It is unclear what dose of ozempic she was on. She goes back and forth. At first she thought 2 mg but after reviewing it multiple times, she thinks 1 mg. Dose in EMR was 0.5 mg but patient was receiving samples. Will prescribe 2 mg weekly and patient will let me know if she develops nausea/vomiting.    #T2DM  - Dx in 2002   - Complications include: retinopathy  - Most recent A1c is 13.9%. Goal a1c is <7%  - Health maintenance:  ==> According to the ADA, BP goal is <130/80. Patient is essentially at goal.  ==> Most recent retinal exam showed proliferative diabetic retinopathy. Due now, was supposed to have 3W follow up following 1/2025 appt  ==> Most recent LDL is 45. Due for repeat now. Goal LDL <70, recommend starting statin therapy given age >45 w/ DM.  ==> Most recent urine alb/Cr ratio is 80.9 (1/2024). Due for repeat in now. Already on ACE-I therapy  ==> Foot exam completed 4/28/25, due for repeat in 4/2026. No evidence of neuropathy.  - Medication management:  ==> INCREASE ozempic to 2 mg weekly  ==> INCREASE fiasp 5-> 7u TIDAC  ==> continue 1:50>150 TIDAC and PRN  ==> continue metformin 1000 mg daily  - Discussed lifestyle management recommendations  - Referral to DM Ed      RTC: 3M at [facility]  (transitioning care to [facility])         Aubrie Townsend MD   of Medicine  Department of Internal Medicine  [facility]  [facility]                                  [1]   Past Medical History:  Diagnosis Date    Band keratopathy of both eyes     Cataract     COVID-19     COVID-19    Diabetic retinopathy (Multi)     HL (hearing loss)     Hyperlipidemia     Hypertension     Personal history of other endocrine, nutritional and metabolic disease 2022    History of hyperlipidemia    Personal history of other endocrine, nutritional and metabolic disease 2022    History of hyperlipidemia    Rosacea blepharoconjunctivitis     Skin disorder     Type 2 diabetes mellitus     Unspecified corneal ulcer, unspecified eye 2014    Peripheral ulcerative keratitis    Vitreous hemorrhage    [2]   Past Surgical History:  Procedure Laterality Date     SECTION, CLASSIC  2015     Section     SECTION, LOW TRANSVERSE      CHOLECYSTECTOMY  2015    Cholecystectomy    PARS PLANA VITRECTOMY Right 10/10/2024    PPV/EL OD    PARS PLANA VITRECTOMY Left 2024    PPV/ EL OS    RETINAL LASER PROCEDURE Left 2024    PRP #1 OS   [3]   Family History  Problem Relation Name Age of Onset    No Known Problems Mother      No Known Problems Father      Glaucoma Neg Hx     [4] No Known Allergies

## 2025-04-27 NOTE — PROGRESS NOTES
Macula OCT   Right eye (OD): temporal DME with IRF, preserved foveal contour, DRIL  Left eye (OS): foveal contour present, some ERM with wrinkling, mild temporal DME, DRIL    Proliferative diabetic retinopathy of both eyes with macular edema associated with type 2 diabetes mellitus  Vitreous hemorrhage of left eye (Multi)    Seen 1/16/25    Today, VH continues to improve OS, as does OS vision  Mild limitation in BCVA today OS>OD - minimal DME, referactive error, TALI, and mild cataracts could also be contributory  Will refer for cataract evaluation as patient does have mild cataracts with symptoms of glare and difficulty seeing at night  DME more significant OD than OS, but still with good foveal contour OU, BCVA 20/25 OD and 20/40 OS - suspect cataract is likely more contributory to BCVA limitation OS  Plan for follow up 3-4 month retina follow up to ensure VH OS does not recur - if BCVA still limited and has DME in either eye, could consider initiation of antiVEGF injections       PRIOR COURSE:   -Initially presented with VH OS, had PRP #1 OS 8/5/24. VH OS was resolving, but developed new VH OD 9/19/25 and recurrent VH OS 9/25/24.  -VH OS was self-resolving, but due to persistent VH OD underwent PPV/EL OD 10/10/24  -Had some DME OD afterwards but patient deferred antiVEGF  -Due to persistent partially resolved VH OS, underwent PPV/EL OS 11/18/24  -POW#2 s/p pars plana vitrectomy (PPV), endolaser (EL), partial air fluid exchange OS 11/18/24  -Today, VA improved OS but still blurry, 20/60 OS today  -No pain  -mild residual heme OS -improving  -Mild cataracts, may be progressing after vitrectomies  -D/c oflox, taper PF  -Follow up 3 weeks, plan for new Mrx in about 1.5 -2 mon provided VH OS settles, if new Mrx not improving VA consider cataract eval

## 2025-04-28 ENCOUNTER — APPOINTMENT (OUTPATIENT)
Dept: ENDOCRINOLOGY | Facility: CLINIC | Age: 65
End: 2025-04-28
Payer: COMMERCIAL

## 2025-04-28 ENCOUNTER — OFFICE VISIT (OUTPATIENT)
Dept: OPHTHALMOLOGY | Facility: CLINIC | Age: 65
End: 2025-04-28
Payer: COMMERCIAL

## 2025-04-28 VITALS
SYSTOLIC BLOOD PRESSURE: 136 MMHG | BODY MASS INDEX: 28.43 KG/M2 | WEIGHT: 141 LBS | HEIGHT: 59 IN | DIASTOLIC BLOOD PRESSURE: 74 MMHG

## 2025-04-28 DIAGNOSIS — H43.12 VITREOUS HEMORRHAGE OF LEFT EYE (MULTI): ICD-10-CM

## 2025-04-28 DIAGNOSIS — E11.3513 PROLIFERATIVE DIABETIC RETINOPATHY OF BOTH EYES WITH MACULAR EDEMA ASSOCIATED WITH TYPE 2 DIABETES MELLITUS: Primary | ICD-10-CM

## 2025-04-28 DIAGNOSIS — E11.65 TYPE 2 DIABETES MELLITUS WITH HYPERGLYCEMIA, WITH LONG-TERM CURRENT USE OF INSULIN: ICD-10-CM

## 2025-04-28 DIAGNOSIS — Z79.4 TYPE 2 DIABETES MELLITUS WITH HYPERGLYCEMIA, WITH LONG-TERM CURRENT USE OF INSULIN: ICD-10-CM

## 2025-04-28 LAB
POC FINGERSTICK BLOOD GLUCOSE: 310 MG/DL (ref 70–100)
POC HEMOGLOBIN A1C: 13.9 % (ref 4.2–6.5)

## 2025-04-28 PROCEDURE — 99213 OFFICE O/P EST LOW 20 MIN: CPT | Performed by: OPHTHALMOLOGY

## 2025-04-28 PROCEDURE — 3075F SYST BP GE 130 - 139MM HG: CPT | Performed by: STUDENT IN AN ORGANIZED HEALTH CARE EDUCATION/TRAINING PROGRAM

## 2025-04-28 PROCEDURE — 99214 OFFICE O/P EST MOD 30 MIN: CPT | Performed by: STUDENT IN AN ORGANIZED HEALTH CARE EDUCATION/TRAINING PROGRAM

## 2025-04-28 PROCEDURE — 92134 CPTRZ OPH DX IMG PST SGM RTA: CPT | Performed by: OPHTHALMOLOGY

## 2025-04-28 PROCEDURE — 83036 HEMOGLOBIN GLYCOSYLATED A1C: CPT | Performed by: STUDENT IN AN ORGANIZED HEALTH CARE EDUCATION/TRAINING PROGRAM

## 2025-04-28 PROCEDURE — 1036F TOBACCO NON-USER: CPT | Performed by: STUDENT IN AN ORGANIZED HEALTH CARE EDUCATION/TRAINING PROGRAM

## 2025-04-28 PROCEDURE — 3078F DIAST BP <80 MM HG: CPT | Performed by: STUDENT IN AN ORGANIZED HEALTH CARE EDUCATION/TRAINING PROGRAM

## 2025-04-28 PROCEDURE — 3008F BODY MASS INDEX DOCD: CPT | Performed by: STUDENT IN AN ORGANIZED HEALTH CARE EDUCATION/TRAINING PROGRAM

## 2025-04-28 PROCEDURE — G2211 COMPLEX E/M VISIT ADD ON: HCPCS | Performed by: STUDENT IN AN ORGANIZED HEALTH CARE EDUCATION/TRAINING PROGRAM

## 2025-04-28 PROCEDURE — 82962 GLUCOSE BLOOD TEST: CPT | Performed by: STUDENT IN AN ORGANIZED HEALTH CARE EDUCATION/TRAINING PROGRAM

## 2025-04-28 PROCEDURE — 4010F ACE/ARB THERAPY RXD/TAKEN: CPT | Performed by: STUDENT IN AN ORGANIZED HEALTH CARE EDUCATION/TRAINING PROGRAM

## 2025-04-28 PROCEDURE — 3046F HEMOGLOBIN A1C LEVEL >9.0%: CPT | Performed by: STUDENT IN AN ORGANIZED HEALTH CARE EDUCATION/TRAINING PROGRAM

## 2025-04-28 RX ORDER — SEMAGLUTIDE 2.68 MG/ML
2 INJECTION, SOLUTION SUBCUTANEOUS
Qty: 3 ML | Refills: 5 | Status: SHIPPED | OUTPATIENT
Start: 2025-04-28

## 2025-04-28 ASSESSMENT — VISUAL ACUITY
OD_SC+: -1
OD_SC: 20/25
METHOD: SNELLEN - LINEAR
OS_SC: 20/40

## 2025-04-28 ASSESSMENT — TONOMETRY
IOP_METHOD: GOLDMANN APPLANATION
OD_IOP_MMHG: 16
OS_IOP_MMHG: 16

## 2025-04-28 ASSESSMENT — ENCOUNTER SYMPTOMS
ALLERGIC/IMMUNOLOGIC NEGATIVE: 0
GASTROINTESTINAL NEGATIVE: 0
EYES NEGATIVE: 1
RESPIRATORY NEGATIVE: 0
NEUROLOGICAL NEGATIVE: 0
CARDIOVASCULAR NEGATIVE: 0
PSYCHIATRIC NEGATIVE: 0
CONSTITUTIONAL NEGATIVE: 0
MUSCULOSKELETAL NEGATIVE: 0
HEMATOLOGIC/LYMPHATIC NEGATIVE: 0
ENDOCRINE NEGATIVE: 0

## 2025-04-28 ASSESSMENT — CONF VISUAL FIELD
OD_INFERIOR_TEMPORAL_RESTRICTION: 0
OS_INFERIOR_TEMPORAL_RESTRICTION: 0
OD_SUPERIOR_NASAL_RESTRICTION: 0
OD_NORMAL: 1
OS_INFERIOR_NASAL_RESTRICTION: 0
OS_SUPERIOR_TEMPORAL_RESTRICTION: 0
OD_SUPERIOR_TEMPORAL_RESTRICTION: 0
OD_INFERIOR_NASAL_RESTRICTION: 0
OS_SUPERIOR_NASAL_RESTRICTION: 0
OS_NORMAL: 1

## 2025-04-28 ASSESSMENT — REFRACTION_MANIFEST
OD_AXIS: 105
OS_AXIS: 084
OS_SPHERE: +0.75
OS_CYLINDER: +0.25
OD_SPHERE: +0.25
METHOD_AUTOREFRACTION: 1
OD_CYLINDER: +0.25

## 2025-04-28 ASSESSMENT — EXTERNAL EXAM - RIGHT EYE: OD_EXAM: SKIN  - ROSACEA

## 2025-04-28 ASSESSMENT — CUP TO DISC RATIO
OD_RATIO: .4
OS_RATIO: 0.35

## 2025-04-28 ASSESSMENT — SLIT LAMP EXAM - LIDS
COMMENTS: 3+ MGD WITH TOOTHPASTE SECRETIONS
COMMENTS: MGD

## 2025-04-28 ASSESSMENT — EXTERNAL EXAM - LEFT EYE: OS_EXAM: NORMAL

## 2025-04-28 NOTE — PATIENT INSTRUCTIONS
After Visit Summary  It was great seeing you today!    In summary from our visit today, I would like to remind you of the following:    - try to exercise for 150 minutes per week (30 minutes per day 5 times per week)  - try to keep meals under 45-60 grams of carbs  - increase to 2 mg of ozempic weekly  - continue metformin 1000 mg twice a day  - increase your tresiba from 25 units to 28 units daily  - increase your fiasp from 5 units before meals to 7 units before meals  - continue correction 1 unit for every 50 points your blood sugar is >150 prior to emals  1:50 correction  151-200 = 1 unit;  201-250 = 2 units;  251-300 = 3 units;  301-350 = 4 units;  351-400 = 5 units  - please get labs done at your convenience (blood and urine)  - Please schedule with diabetes education at your convenience      Division of Endocrinology   Follow-up appointments:  Please arrive at least 20 minutes prior to your follow up appointments in order to keep visits as close as possible to the scheduled times. If you need to cancel an appointment, please call at least 24 hours in advance.    Please bring your medications or an updated list of medications to each of your visits to help ensure safety in prescribing future medications.    If you are being seen for diabetes, please bring your glucose meter and/or glucose log with 2 weeks of glucose readings to each visit.    Medication Refills: Please request medication refills at the time of your appointment.   - Refills requested by phone or [employer] in between visits require at least 3 business days to be processed.    Lab Results: Please allow at least 7 business days for lab results to be reviewed.  - Please note, that some specialty testing may take up to at least 7 business to be completed.   - If there are any urgent issues requiring immediate attention, we will contact you at your provided phone numbers.   - Any non-urgent results will be relayed via [facility] if you have signed up for  this service or via a letter through the mail and/or phone call.     Communication with your provider:  - [facility] [employer] is highly recommended as the most efficient means to contact your provider. However for urgent concerns please call.   - For phone calls, please call our office Monday- Friday 8am to 4:30pm and your message will be relayed to your provider. Please allows 1-2 business days for a response.  - After hours messages are left with an answering service and will be handled by the clinic the following business day.      Sincerely,   Aubrie Townsend MD  Division of Endocrinology  [facility]

## 2025-05-15 LAB
ALBUMIN SERPL-MCNC: 4 G/DL (ref 3.6–5.1)
ALBUMIN/CREAT UR: 38 MG/G CREAT
ALP SERPL-CCNC: 220 U/L (ref 37–153)
ALT SERPL-CCNC: 101 U/L (ref 6–29)
ANION GAP SERPL CALCULATED.4IONS-SCNC: 8 MMOL/L (CALC) (ref 7–17)
AST SERPL-CCNC: 57 U/L (ref 10–35)
BILIRUB SERPL-MCNC: 0.5 MG/DL (ref 0.2–1.2)
BUN SERPL-MCNC: 18 MG/DL (ref 7–25)
CALCIUM SERPL-MCNC: 8.9 MG/DL (ref 8.6–10.4)
CHLORIDE SERPL-SCNC: 103 MMOL/L (ref 98–110)
CHOLEST SERPL-MCNC: 157 MG/DL
CHOLEST SERPL-MCNC: 158 MG/DL
CHOLEST/HDLC SERPL: 2.3 (CALC)
CHOLEST/HDLC SERPL: 2.3 (CALC)
CO2 SERPL-SCNC: 27 MMOL/L (ref 20–32)
CREAT SERPL-MCNC: 0.5 MG/DL (ref 0.5–1.05)
CREAT UR-MCNC: 125 MG/DL (ref 20–275)
EGFRCR SERPLBLD CKD-EPI 2021: 106 ML/MIN/1.73M2
ERYTHROCYTE [DISTWIDTH] IN BLOOD BY AUTOMATED COUNT: 13.1 % (ref 11–15)
GLUCOSE SERPL-MCNC: 201 MG/DL (ref 65–99)
HCT VFR BLD AUTO: 43.6 % (ref 35–45)
HDLC SERPL-MCNC: 68 MG/DL
HDLC SERPL-MCNC: 69 MG/DL
HGB BLD-MCNC: 14.1 G/DL (ref 11.7–15.5)
LDLC SERPL CALC-MCNC: 68 MG/DL (CALC)
LDLC SERPL CALC-MCNC: 70 MG/DL (CALC)
MCH RBC QN AUTO: 30.5 PG (ref 27–33)
MCHC RBC AUTO-ENTMCNC: 32.3 G/DL (ref 32–36)
MCV RBC AUTO: 94.4 FL (ref 80–100)
MICROALBUMIN UR-MCNC: 4.7 MG/DL
NONHDLC SERPL-MCNC: 88 MG/DL (CALC)
NONHDLC SERPL-MCNC: 90 MG/DL (CALC)
PLATELET # BLD AUTO: 216 THOUSAND/UL (ref 140–400)
PMV BLD REES-ECKER: 10.1 FL (ref 7.5–12.5)
POTASSIUM SERPL-SCNC: 4.2 MMOL/L (ref 3.5–5.3)
PROT SERPL-MCNC: 6.4 G/DL (ref 6.1–8.1)
RBC # BLD AUTO: 4.62 MILLION/UL (ref 3.8–5.1)
SODIUM SERPL-SCNC: 138 MMOL/L (ref 135–146)
TRIGL SERPL-MCNC: 112 MG/DL
TRIGL SERPL-MCNC: 114 MG/DL
WBC # BLD AUTO: 6.8 THOUSAND/UL (ref 3.8–10.8)

## 2025-05-20 DIAGNOSIS — R79.89 ABNORMAL LFTS: Primary | ICD-10-CM

## 2025-05-29 ENCOUNTER — HOSPITAL ENCOUNTER (OUTPATIENT)
Dept: RADIOLOGY | Facility: CLINIC | Age: 65
Discharge: HOME | End: 2025-05-29
Payer: COMMERCIAL

## 2025-05-29 ENCOUNTER — OFFICE VISIT (OUTPATIENT)
Facility: CLINIC | Age: 65
End: 2025-05-29
Payer: COMMERCIAL

## 2025-05-29 VITALS
DIASTOLIC BLOOD PRESSURE: 84 MMHG | RESPIRATION RATE: 18 BRPM | BODY MASS INDEX: 28.22 KG/M2 | WEIGHT: 140 LBS | HEIGHT: 59 IN | SYSTOLIC BLOOD PRESSURE: 128 MMHG | OXYGEN SATURATION: 98 % | TEMPERATURE: 97.5 F | HEART RATE: 88 BPM

## 2025-05-29 DIAGNOSIS — M19.042 ARTHRITIS OF BOTH HANDS: ICD-10-CM

## 2025-05-29 DIAGNOSIS — M54.2 NECK PAIN ON RIGHT SIDE: ICD-10-CM

## 2025-05-29 DIAGNOSIS — M25.511 ACUTE PAIN OF RIGHT SHOULDER: Primary | ICD-10-CM

## 2025-05-29 DIAGNOSIS — M25.511 ACUTE PAIN OF RIGHT SHOULDER: ICD-10-CM

## 2025-05-29 DIAGNOSIS — J34.89 INTERNAL NASAL LESION: ICD-10-CM

## 2025-05-29 DIAGNOSIS — M19.041 ARTHRITIS OF BOTH HANDS: ICD-10-CM

## 2025-05-29 DIAGNOSIS — R53.83 OTHER FATIGUE: ICD-10-CM

## 2025-05-29 DIAGNOSIS — E55.9 VITAMIN D DEFICIENCY: ICD-10-CM

## 2025-05-29 PROCEDURE — 1036F TOBACCO NON-USER: CPT | Performed by: FAMILY MEDICINE

## 2025-05-29 PROCEDURE — 73030 X-RAY EXAM OF SHOULDER: CPT | Mod: RT

## 2025-05-29 PROCEDURE — 3074F SYST BP LT 130 MM HG: CPT | Performed by: FAMILY MEDICINE

## 2025-05-29 PROCEDURE — 3079F DIAST BP 80-89 MM HG: CPT | Performed by: FAMILY MEDICINE

## 2025-05-29 PROCEDURE — 3008F BODY MASS INDEX DOCD: CPT | Performed by: FAMILY MEDICINE

## 2025-05-29 PROCEDURE — 73030 X-RAY EXAM OF SHOULDER: CPT | Mod: RIGHT SIDE | Performed by: RADIOLOGY

## 2025-05-29 PROCEDURE — 3046F HEMOGLOBIN A1C LEVEL >9.0%: CPT | Performed by: FAMILY MEDICINE

## 2025-05-29 PROCEDURE — 99214 OFFICE O/P EST MOD 30 MIN: CPT | Performed by: FAMILY MEDICINE

## 2025-05-29 PROCEDURE — 4010F ACE/ARB THERAPY RXD/TAKEN: CPT | Performed by: FAMILY MEDICINE

## 2025-05-29 RX ORDER — MUPIROCIN 20 MG/G
OINTMENT TOPICAL 3 TIMES DAILY
Qty: 22 G | Refills: 0 | Status: SHIPPED | OUTPATIENT
Start: 2025-05-29 | End: 2025-06-08

## 2025-05-29 RX ORDER — MELOXICAM 15 MG/1
15 TABLET ORAL DAILY PRN
Qty: 30 TABLET | Refills: 0 | Status: SHIPPED | OUTPATIENT
Start: 2025-05-29 | End: 2025-06-28

## 2025-05-29 ASSESSMENT — ENCOUNTER SYMPTOMS
EYES NEGATIVE: 1
ENDOCRINE NEGATIVE: 1
GASTROINTESTINAL NEGATIVE: 1
CARDIOVASCULAR NEGATIVE: 1
NEUROLOGICAL NEGATIVE: 1
CONSTITUTIONAL NEGATIVE: 1
PSYCHIATRIC NEGATIVE: 1
NECK PAIN: 1
RESPIRATORY NEGATIVE: 1
ARTHRALGIAS: 1

## 2025-05-29 NOTE — PROGRESS NOTES
Subjective   Patient ID: [id number]   Stacie Richmond is a 64 y.o. female with DL, HTN, DM, vi D def, who presents for hand arthritis, referred by her PCP Dr. Sun     Current rheum meds:  - Meloxicam 15 mg daily     Previous rheum meds:  -    HPI  Saw Dr. Lopez in 3/23       PSH: No MSK surgeries   Allergies: NKDA   Habits: No smoking, no alcohol, no recreational drugs   Social hx:     ROS:  Constitutional: Denies fever, chills, weight loss, night sweats or headaches  Eyes: Denies dry eyes, blurry vision, redness or pain  ENT: Denies dry mouth, dental loss, loss of taste, nasal or oral ulcers, jaw claudication, difficulty swallowing, nasal crusting or recurrent sinus infections   Cardiovascular: Denies chest pain, palpitations, orthopnea  Respiratory: Denies shortness of breath, cough, asthma, or recurrent respiratory infections  Gastrointestinal: Denies dysphagia, nausea, vomiting, heartburn, abdominal pain, constipation, diarrhea, melena or hematochezia  Genitourinary: No recurrent urinary infections or STDs, no genital or anal ulcers  Integumentary: Denies photosensitivity, rash or lesions, Raynaud's phenomenon, skin tightening, digital ulcers, psoriatic lesions, or alopecia  Neurological: Denies any numbness or tingling, muscle weakness, or incontinence   Hematologic/Lymphatic: Denies bleeding, bruising, history of clots (arterial or venous), or abortions/miscarriages/pregnancy complications   MSK: No joint pains, redness, hotness or swelling. No inflammatory back pain, enthesitis, dactylitis. No morning stiffness   Muscular: Denies weakness, difficulty rising from chair or combing the hair, muscle aches, or problems with hand    FHx: No family history of autoimmune diseases     Rheum hx (Recall from Dr. Lopez's notes):  Ms. Richmond is a 63 yo F with PMHx of anterior uveitis, DM, HLP, HTN, rosacea, and vitamin D deficiency presenting as a NP.     She has chronic pain (>1 year) in her hands and shoulders,  knees, ankles and she also has morning stiffness. Stiffness usually lasts for 1 hour. Hand pain is usually in wrists and MCPs. She has hand swelling occasionally. She has difficulties making a fist or opening jars and doors. She has numbness and tingling in her hands. She had a history of anterior uveitis in 2014. I am concerned about inflammatory arthritis/RA and will need to send serologies and hand xrays. Her right shoulder pain is likely rotator cuff as per exam and will need xrays and consider T/cortisone injections. RTC 2 months       Problem List[1]     Medical History[2]     Surgical History[3]     Social History     Socioeconomic History    Marital status: Single     Spouse name: Not on file    Number of children: Not on file    Years of education: Not on file    Highest education level: Not on file   Occupational History    Not on file   Tobacco Use    Smoking status: Never     Passive exposure: Never    Smokeless tobacco: Never    Tobacco comments:     Pt denies any illness in past 30 days     Denies personal/family reaction or problems with anesthesia     Denies SOB with stairs or daily activity     Ambulates freely     Able to lay flat x 30 minutes     Updated 11/12/24, no changes   Vaping Use    Vaping status: Never Used   Substance and Sexual Activity    Alcohol use: Not Currently     Comment: socially    Drug use: Never    Sexual activity: Yes   Other Topics Concern    Not on file   Social History Narrative    Not on file     Social Drivers of Health     Financial Resource Strain: Not on File (3/30/2021)    Received from [facility]    Financial Resource Strain     Financial Resource Strain: 0   Food Insecurity: Not on File (3/30/2021)    Received from [facility]    Food Insecurity     Food: 0   Transportation Needs: Not on File (3/30/2021)    Received from [facility]    Transportation Needs     Transportation: 0   Physical Activity: Not on File (3/30/2021)    Received from [facility]    Physical Activity     Physical  Activity: 0   Stress: Not on File (3/30/2021)    Received from [facility]    Stress     Stress: 0   Social Connections: Not on File (3/30/2021)    Received from [facility]    Social [employer]     Social Connections and Isolation: 0   Intimate Partner Violence: Not on file   Housing Stability: Not on File (3/30/2021)    Received from [facility]    Housing Stability     Housin      RX Allergies[4]     Current Medications[5]     Objective   Visit Vitals  OB Status Postmenopausal   Smoking Status Never     Physical Exam:  General: AAOx3, Cooperative  Head: normocephalic, atraumatic, no hair loss   Eyes: EOMI, conjunctiva clear, sclera white, anicteric  Ears: hearing intact  Nose: no deformity, no crusting   Throat/Mouth: No oral deformities, no cheek swelling, mucosa appear moist, no oral ulcers noted or loss of dentition   Neck/Lymph: FROM, trachea midline  Lungs: chest expansion symmetric, clear to auscultation bilaterally. No wheezing, rhonchi, or stridor  Heart: S1, S2, RRR. No murmur or rub  Abdomen: Soft, non-tender without masses  Neuro: CN II-XII grossly intact, no focal deficit  Skin: No rashes, ulcers or photosensitive areas  MSK: Upper Extremities:  Hand/Fingers: No erythema, edema, tenderness or warmth at DIP, PIP, or MCP joints, FROM grossly. Good hand . No nodules. No deformities   Wrists: No erythema, edema, warmth or tenderness at wrist, FROM grossly  Elbows: No tenderness, edema, erythema or warmth at elbows, FROM grossly. No nodules   Shoulders: No edema, erythema, tenderness or warmth at shoulders. FROM  Lower Extremities:   Hips: No obvious deformities. No joint tenderness, normal ROM grossly. Log roll test negative bilaterally. Prasad test is negative bilaterally. No trochanteric bursae TTP  Knees: No tenderness, deformities, edema, rashes, or warmth, normal ROM grossly. No crepitus, no pes anserine bursa TTP   Ankles, feet: No deformities, tenderness, edema, erythema, ulceration, or warmth at the ankle  or MTP/IP joints, normal ROM grossly  Spine: No spinal tenderness to palpation. No SI joint tenderness. Gaenslen test negative      Lab Results   Component Value Date    WBC 6.8 05/14/2025    HGB 14.1 05/14/2025    HCT 43.6 05/14/2025    MCV 94.4 05/14/2025     05/14/2025        Chemistry    Lab Results   Component Value Date/Time     05/14/2025 1349    K 4.2 05/14/2025 1349     05/14/2025 1349    CO2 27 05/14/2025 1349    BUN 18 05/14/2025 1349    CREATININE 0.50 05/14/2025 1349    Lab Results   Component Value Date/Time    CALCIUM 8.9 05/14/2025 1349    ALKPHOS 220 (H) 05/14/2025 1349    AST 57 (H) 05/14/2025 1349     (H) 05/14/2025 1349    BILITOT 0.5 05/14/2025 1349        Lab Results   Component Value Date    CRP 0.14 03/22/2023      Lab Results   Component Value Date    DINA NEGATIVE 03/22/2023    RF <10 03/22/2023    SEDRATE 13 03/22/2023     Lab Results   Component Value Date    NEUTROABS 4.29 03/22/2023     Lab Results   Component Value Date     (H) 05/14/2025    AST 57 (H) 05/14/2025    ALKPHOS 220 (H) 05/14/2025    BILITOT 0.5 05/14/2025     Lab Results   Component Value Date    CALCIUM 8.9 05/14/2025     OCT, Retina - OU - Both Eyes  Right Eye  Quality was good. Scan locations included subfoveal. Findings include   diabetic retinopathy.     Left Eye  Quality was good. Scan locations included subfoveal. Findings include   diabetic retinopathy.     Notes  Retinal multimodal imaging including photography was completed, and the   findings are described in the examination.     === 08/28/23 ===  XR TOE  Osteopenia  Soft tissue swelling  No plain film sign of acute fracture or dislocation.          Assessment/Plan    This is a DL, HTN, DM, vi D def, who presents for hand arthritis, referred by her PCP Dr. Sun     Labs:  4/25: A1c 14%, Ualbumin/Cr elevated, , AST 57, , lipid panel, CBC, rest of CMP wnl  3/23: , rest of CMP, C3, C4, ESR, CBC, CRP wnl  RF,  DINA, CCP neg  ANCA neg     Imaging:  Xray right hand and wrist: OA    Xray shoulder: OA    # OA    - Labs today  - Xrays today  - I will inform the patient of any urgent results, if any    RTC in 1 month for discussion of results     Plan, including risks and benefits, was discussed with the patient, informed on how to reach us.     To schedule an appointment, call [phone]  To reach the rheumatology office, call [phone]    Ana Milner MD      Division of Rheumatology  [facility]          [1]   Patient Active Problem List  Diagnosis    Type 2 diabetes mellitus with hyperglycemia, with long-term current use of insulin    Nasal congestion    Vitamin D deficiency    Dyslipidemia    Diabetes mellitus type 2 without retinopathy (Multi)    Corneal ulcer    Abnormal LFTs    NPDR (nonproliferative diabetic retinopathy)    Proliferative diabetic retinopathy of left eye associated with type 2 diabetes mellitus    Hyperopia with presbyopia of both eyes    Combined forms of age-related cataract of both eyes    Vitreous hemorrhage, right eye (Multi)    HTN (hypertension)    Proliferative diabetic retinopathy of both eyes with macular edema associated with type 2 diabetes mellitus    Vitreous hemorrhage of left eye (Multi)    H/O vitrectomy   [2]   Past Medical History:  Diagnosis Date    Band keratopathy of both eyes     Cataract     COVID-19     COVID-19    Diabetic retinopathy (Multi)     HL (hearing loss)     Hyperlipidemia     Hypertension     Personal history of other endocrine, nutritional and metabolic disease 04/26/2022    History of hyperlipidemia    Personal history of other endocrine, nutritional and metabolic disease 04/26/2022    History of hyperlipidemia    Rosacea blepharoconjunctivitis     Skin disorder     Type 2 diabetes mellitus     Unspecified corneal ulcer, unspecified eye 12/30/2014    Peripheral ulcerative keratitis    Vitreous hemorrhage    [3]   Past Surgical  "History:  Procedure Laterality Date     SECTION, CLASSIC  2015     Section     SECTION, LOW TRANSVERSE      CHOLECYSTECTOMY  2015    Cholecystectomy    PARS PLANA VITRECTOMY Right 10/10/2024    PPV/EL OD    PARS PLANA VITRECTOMY Left 2024    PPV/ EL OS    RETINAL LASER PROCEDURE Left 2024    PRP #1 OS   [4] No Known Allergies  [5]   Current Outpatient Medications:     blood sugar diagnostic (Blood Glucose Test) strip, Check blood glucose 4 time per day, Disp: 400 each, Rfl: 3    blood-glucose meter misc, Use to check blood glucose 4 times per day, Disp: 1 each, Rfl: 0    blood-glucose sensor (FreeStyle Tina 3 Plus Sensor) device, Change every 15 days (Patient not taking: Reported on 2025), Disp: 6 each, Rfl: 1    insulin aspart, with niacinamide, (Fiasp FlexTouch U-100 Insulin) 100 unit/mL (3 mL) injection, Inject 5 units with each meal.  Give 2 units for every 50 over 150.  Max daily dose 51 units, Disp: 60 mL, Rfl: 1    lancets 33 gauge misc, Use to check blood sugar 4 times per day, Disp: 400 each, Rfl: 3    lisinopril 10 mg tablet, Take 1 tablet (10 mg) by mouth once daily., Disp: 90 tablet, Rfl: 3    meloxicam (Mobic) 15 mg tablet, Take 1 tablet (15 mg) by mouth once daily as needed (pain)., Disp: 30 tablet, Rfl: 0    metFORMIN (Glucophage) 1,000 mg tablet, Take 1 tablet (1,000 mg) by mouth 2 times a day., Disp: 180 tablet, Rfl: 3    metroNIDAZOLE (Metrocream) 0.75 % cream, Apply 1 Application topically once daily., Disp: 45 g, Rfl: 11    minocycline 100 mg capsule, TAKE 1 CAPSULE BY MOUTH ONCE DAILY., Disp: 90 capsule, Rfl: 0    mupirocin (Bactroban) 2 % ointment, Apply topically 3 times a day for 10 days. apply to affected area, Disp: 22 g, Rfl: 0    pen needle, diabetic 32 gauge x 5/32\" needle, 1 each 4 times a day. Use to inject insulin 4 times daily, Disp: 400 each, Rfl: 1    semaglutide (Ozempic) 2 mg/dose (8 mg/3 mL) pen injector, Inject 2 mg under " the skin 1 (one) time per week., Disp: 3 mL, Rfl: 5    Tresiba FlexTouch U-100 100 unit/mL (3 mL) injection, Inject 25 Units under the skin once daily in the morning., Disp: 30 mL, Rfl: 1

## 2025-05-29 NOTE — PROGRESS NOTES
"Subjective     Patient ID: Stacie Richmond is a 64 y.o. female who presents for Shoulder Pain.  Shoulder Pain   The pain is present in the right shoulder. This is a new problem. The current episode started 1 to 4 weeks ago. The problem occurs constantly. The problem has been unchanged. The quality of the pain is described as aching, burning and sharp. The pain is at a severity of 8/10. The pain is mild. The symptoms are aggravated by activity. She has tried acetaminophen for the symptoms. The treatment provided no relief.   Pt stated it has been going on for a few weeks and hasn't gotten better. Pt also has complaints of the area feeling warm. Pt also stated she has been feeling week as well.   Pt states she has nausea, some reflux symptoms and gastro upset. Her recent labs showed elevated liver enzymes. She does not have a gallbladder.  She states she has a non-healing lesion on the inside of her left nares.   She has chronic arthritis in her hands and would like to see a specialist for that as well.    Review of Systems   Constitutional: Negative.    HENT: Negative.     Eyes: Negative.    Respiratory: Negative.     Cardiovascular: Negative.    Gastrointestinal: Negative.    Endocrine: Negative.    Genitourinary: Negative.    Musculoskeletal:  Positive for arthralgias and neck pain.   Skin: Negative.    Neurological: Negative.    Psychiatric/Behavioral: Negative.         Objective     Vitals:    05/29/25 0956   Resp: 18   Temp: 36.4 °C (97.5 °F)   TempSrc: Temporal   Height: 1.499 m (4' 11\")        Current Outpatient Medications   Medication Instructions    blood sugar diagnostic (Blood Glucose Test) strip Check blood glucose 4 time per day    blood-glucose meter misc Use to check blood glucose 4 times per day    blood-glucose sensor (FreeStyle Tina 3 Plus Sensor) device Change every 15 days    insulin aspart, with niacinamide, (Fiasp FlexTouch U-100 Insulin) 100 unit/mL (3 mL) injection Inject 5 units with each " "meal.  Give 2 units for every 50 over 150.  Max daily dose 51 units    lancets 33 gauge misc Use to check blood sugar 4 times per day    lisinopril 10 mg, oral, Daily    metFORMIN (GLUCOPHAGE) 1,000 mg, oral, 2 times daily    metroNIDAZOLE (Metrocream) 0.75 % cream 1 Application, Topical, Daily    minocycline 100 mg, oral, Daily    Ozempic 2 mg, subcutaneous, Once Weekly    pen needle, diabetic 32 gauge x 5/32\" needle 1 each, miscellaneous, 4 times daily, Use to inject insulin 4 times daily    Tresiba FlexTouch U-100 25 Units, subcutaneous, Every morning        Physical Exam  Constitutional:       Appearance: Normal appearance.   HENT:      Head: Normocephalic and atraumatic.      Right Ear: Tympanic membrane normal.      Left Ear: Tympanic membrane normal.      Nose: Nose normal.      Comments: Small abrasion in right nostril on the inside     Mouth/Throat:      Mouth: Mucous membranes are moist.   Cardiovascular:      Rate and Rhythm: Normal rate and regular rhythm.   Pulmonary:      Effort: Pulmonary effort is normal.      Breath sounds: Normal breath sounds.   Musculoskeletal:      Right shoulder: Tenderness and crepitus present. Decreased range of motion.      Left shoulder: Normal.   Psychiatric:         Behavior: Behavior is cooperative.         Assessment/Plan   Assessment & Plan  Acute pain of right shoulder    Orders:    XR shoulder right 2+ views; Future    Referral to Sports Medicine; Future    meloxicam (Mobic) 15 mg tablet; Take 1 tablet (15 mg) by mouth once daily as needed (pain).    Neck pain on right side  Likely from the shoulder, but will see if it resolves with treatment.  Orders:    meloxicam (Mobic) 15 mg tablet; Take 1 tablet (15 mg) by mouth once daily as needed (pain).    Other fatigue    Orders:    Thyroid Stimulating Hormone; Future    Vitamin D deficiency    Orders:    Vitamin D 25-Hydroxy,Total; Future    Arthritis of both hands    Orders:    Referral to Rheumatology; Future    Internal " nasal lesion    Orders:    mupirocin (Bactroban) 2 % ointment; Apply topically 3 times a day for 10 days. apply to affected area

## 2025-05-30 DIAGNOSIS — E55.9 VITAMIN D DEFICIENCY: Primary | ICD-10-CM

## 2025-05-30 LAB
25(OH)D3+25(OH)D2 SERPL-MCNC: 14 NG/ML (ref 30–100)
TSH SERPL-ACNC: 1.07 MIU/L (ref 0.4–4.5)

## 2025-05-30 RX ORDER — ERGOCALCIFEROL 1.25 MG/1
1.25 CAPSULE ORAL
Qty: 4 CAPSULE | Refills: 2 | Status: SHIPPED | OUTPATIENT
Start: 2025-06-01 | End: 2025-08-24

## 2025-06-09 ENCOUNTER — APPOINTMENT (OUTPATIENT)
Dept: ORTHOPEDIC SURGERY | Facility: CLINIC | Age: 65
End: 2025-06-09
Payer: COMMERCIAL

## 2025-06-10 ENCOUNTER — APPOINTMENT (OUTPATIENT)
Dept: RHEUMATOLOGY | Facility: CLINIC | Age: 65
End: 2025-06-10
Payer: COMMERCIAL

## 2025-06-10 DIAGNOSIS — M15.0 PRIMARY OSTEOARTHRITIS INVOLVING MULTIPLE JOINTS: Primary | ICD-10-CM

## 2025-06-20 ENCOUNTER — OFFICE VISIT (OUTPATIENT)
Dept: GASTROENTEROLOGY | Facility: CLINIC | Age: 65
End: 2025-06-20
Payer: COMMERCIAL

## 2025-06-20 VITALS
SYSTOLIC BLOOD PRESSURE: 110 MMHG | HEART RATE: 86 BPM | DIASTOLIC BLOOD PRESSURE: 74 MMHG | WEIGHT: 139 LBS | HEIGHT: 59 IN | TEMPERATURE: 98.3 F | BODY MASS INDEX: 28.02 KG/M2

## 2025-06-20 DIAGNOSIS — R79.89 ABNORMAL LFTS: ICD-10-CM

## 2025-06-20 DIAGNOSIS — R74.8 ELEVATED LIVER ENZYMES: Primary | ICD-10-CM

## 2025-06-20 DIAGNOSIS — K76.0 NON-ALCOHOLIC FATTY LIVER DISEASE: ICD-10-CM

## 2025-06-20 PROCEDURE — 3008F BODY MASS INDEX DOCD: CPT | Performed by: NURSE PRACTITIONER

## 2025-06-20 PROCEDURE — 3046F HEMOGLOBIN A1C LEVEL >9.0%: CPT | Performed by: NURSE PRACTITIONER

## 2025-06-20 PROCEDURE — 3074F SYST BP LT 130 MM HG: CPT | Performed by: NURSE PRACTITIONER

## 2025-06-20 PROCEDURE — 99204 OFFICE O/P NEW MOD 45 MIN: CPT | Performed by: NURSE PRACTITIONER

## 2025-06-20 PROCEDURE — 3078F DIAST BP <80 MM HG: CPT | Performed by: NURSE PRACTITIONER

## 2025-06-20 PROCEDURE — 99212 OFFICE O/P EST SF 10 MIN: CPT | Performed by: NURSE PRACTITIONER

## 2025-06-20 ASSESSMENT — ENCOUNTER SYMPTOMS
HEADACHES: 0
FEVER: 0
TREMORS: 0
AGITATION: 0
TROUBLE SWALLOWING: 0
BLOOD IN STOOL: 0
WEAKNESS: 0
DYSURIA: 0
PALPITATIONS: 0
ABDOMINAL DISTENTION: 0
JOINT SWELLING: 0
DIARRHEA: 0
WHEEZING: 0
COLOR CHANGE: 0
APPETITE CHANGE: 0
NAUSEA: 0
CONSTIPATION: 0
SHORTNESS OF BREATH: 0
BRUISES/BLEEDS EASILY: 0
ABDOMINAL PAIN: 0
VOICE CHANGE: 0
UNEXPECTED WEIGHT CHANGE: 0
FREQUENCY: 0
SLEEP DISTURBANCE: 0
LIGHT-HEADEDNESS: 0
DIZZINESS: 0
COUGH: 0
CHILLS: 0
DIFFICULTY URINATING: 0
CONFUSION: 0
VOMITING: 0
HEMATURIA: 0
NUMBNESS: 0

## 2025-06-20 ASSESSMENT — PAIN SCALES - GENERAL: PAINLEVEL_OUTOF10: 0-NO PAIN

## 2025-06-20 NOTE — PROGRESS NOTES
Patient ID: Stacie Richmond is a 64 y.o. female who presents in consultation elevated liver enzymes.    HPI  64 year old with history of arthritis, T2DM, HTN and DLD referred for elevated liver enzymes.     Pt has had chronically elevated alk phos and intermittent elevation in ALT over the last several years.  More recently however, she was found to have more elevated liver enzymes.  05/2025:  Alk Phos 220, AST 57, .  Normal bilirubin.     No recent illness, travel.   Diabetes has been under better control over the last several months.  No recent imaging.  Has noticed more fatigue.  Intermittent itching-nothing severe.  Denies dry eyes and mouth.    Family history of DM and HTN.       Denies jaundice, icterus, itching, abdominal distension, edema, bleeding or confusion.    Denies fevers, chills, abdominal pain.         Review of Systems   Constitutional:  Negative for appetite change, chills, fever and unexpected weight change.   HENT:  Negative for mouth sores, nosebleeds, trouble swallowing and voice change.    Eyes:  Negative for visual disturbance.   Respiratory:  Negative for cough, shortness of breath and wheezing.    Cardiovascular:  Negative for chest pain, palpitations and leg swelling.   Gastrointestinal:  Negative for abdominal distention, abdominal pain, blood in stool, constipation, diarrhea, nausea and vomiting.   Genitourinary:  Negative for decreased urine volume, difficulty urinating, dysuria, frequency, hematuria and urgency.   Musculoskeletal:  Negative for gait problem and joint swelling.   Skin:  Negative for color change, pallor and rash.   Neurological:  Negative for dizziness, tremors, weakness, light-headedness, numbness and headaches.   Hematological:  Does not bruise/bleed easily.   Psychiatric/Behavioral:  Negative for agitation, behavioral problems, confusion and sleep disturbance.        Objective   Physical Exam  Constitutional:       General: She is awake.      Appearance:  Normal appearance. She is well-developed.   HENT:      Head: Normocephalic and atraumatic.      Right Ear: Hearing normal.      Left Ear: Hearing normal.      Nose: Nose normal.      Mouth/Throat:      Lips: Pink.      Mouth: Mucous membranes are moist.   Eyes:      General: Lids are normal.      Extraocular Movements: Extraocular movements intact.      Conjunctiva/sclera: Conjunctivae normal.      Pupils: Pupils are equal, round, and reactive to light.   Cardiovascular:      Rate and Rhythm: Normal rate and regular rhythm.      Pulses: Normal pulses.      Heart sounds: Normal heart sounds.   Pulmonary:      Effort: Pulmonary effort is normal.      Breath sounds: Normal breath sounds.   Abdominal:      General: Abdomen is flat. Bowel sounds are normal.      Palpations: Abdomen is soft.   Musculoskeletal:      Cervical back: Normal range of motion and neck supple.   Feet:      Right foot:      Skin integrity: Skin integrity normal.      Left foot:      Skin integrity: Skin integrity normal.   Skin:     General: Skin is warm.   Neurological:      General: No focal deficit present.      Mental Status: She is alert and oriented to person, place, and time.      Cranial Nerves: Cranial nerves 2-12 are intact.      Sensory: Sensation is intact.      Motor: Motor function is intact.      Coordination: Coordination is intact.      Gait: Gait is intact.   Psychiatric:         Attention and Perception: Attention and perception normal.         Mood and Affect: Mood normal.         Speech: Speech normal.         Behavior: Behavior is cooperative.         Thought Content: Thought content normal.         Cognition and Memory: Cognition normal.         Judgment: Judgment normal.       Current Medications[1]           Assessment/Plan   Problem List Items Addressed This Visit           ICD-10-CM    Abnormal LFTs R79.89    Elevated liver enzymes - Primary R74.8    Elevated liver enzymes suspected to be secondary to MASH with history  of DM, HTN,  ethnicity.  However, other etiologies should be ruled out including autoimmune disease, biliary disease and viral hepatitis.  Liver disease work-up will be completed including imaging to assess for fatty liver.             Relevant Orders    Enhanced Liver Fibrosis (ELF) Score (Completed)    Liver Elastography (Fibroscan)    Alkaline Phosphatase, Isoenzymes (Completed)    DINA with Reflex to KEHINDE (Completed)    Anti-Mitochondrial Antibody (Completed)    Anti-Smooth Muscle Antibody (Completed)    QUEST MISCELLANEOUS TEST (ROOM TEMPERATURE)    QUEST MISCELLANEOUS TEST (REFRIGERATED)    QUEST MISCELLANEOUS TEST (ROOM TEMPERATURE)    CBC and Auto Differential (Completed)    Comprehensive Metabolic Panel (Completed)    Hepatitis C Antibody (Completed)    Hepatitis B Surface Antigen (Completed)    Hepatitis B Surface Antibody (Completed)    Hepatitis A Antibody, Total (Completed)    Hepatitis B Core Antibody, Total (Completed)    Immunoglobulins (IgG, IgA, IgM) (Completed)    US abdomen complete    Non-alcoholic fatty liver disease K76.0    Discussed natural history of fatty liver including risk factors and management.  Exercise/Activity  Vigorous physical activity like brisk walking or structured gym exercises 3 times a week for 30 minutes at minimum.    Resistance training to build muscle mass which will aid in weight loss.  Swimming, water aerobics are excellent forms of exercises that are easy on joints.    Exercise for 30 minutes or more at least 3 times a week  Aim to lose 7-10% of your total body weight over 6-12 months  Diet  Avoid/Limit simple carbs including simple sugars  Avoid eating foods that are rich in sugar in excess such as high sugar content fruits (pineapple, real...) and desserts, cakes and pies  Eat more good foods that are rich in good fat such as cold water fish (salmon, swordfish...) as well as avocados and peanut butter in moderation  Snack on nuts that are high in protein and  "good oils such as walnuts, almonds.   Eat a mediteranean diet which is rich in grilled lean meats, high protein beans and legumes and olive oil.                          XUAN Alba 06/20/25 10:41 AM              [1]   Current Outpatient Medications   Medication Sig Dispense Refill    blood sugar diagnostic (Blood Glucose Test) strip Check blood glucose 4 time per day 400 each 3    blood-glucose meter misc Use to check blood glucose 4 times per day 1 each 0    blood-glucose sensor (FreeStyle Tina 3 Plus Sensor) device Change every 15 days (Patient not taking: Reported on 5/29/2025) 6 each 1    ergocalciferol (Vitamin D-2) 1250 mcg (50,000 units) capsule Take 1 capsule (1.25 mg) by mouth 1 (one) time per week. 4 capsule 2    insulin aspart, with niacinamide, (Fiasp FlexTouch U-100 Insulin) 100 unit/mL (3 mL) injection Inject 5 units with each meal.  Give 2 units for every 50 over 150.  Max daily dose 51 units 60 mL 1    lancets 33 gauge misc Use to check blood sugar 4 times per day 400 each 3    lisinopril 10 mg tablet Take 1 tablet (10 mg) by mouth once daily. 90 tablet 3    meloxicam (Mobic) 15 mg tablet Take 1 tablet (15 mg) by mouth once daily as needed (pain). 30 tablet 0    metFORMIN (Glucophage) 1,000 mg tablet Take 1 tablet (1,000 mg) by mouth 2 times a day. 180 tablet 3    metroNIDAZOLE (Metrocream) 0.75 % cream Apply 1 Application topically once daily. 45 g 11    minocycline 100 mg capsule TAKE 1 CAPSULE BY MOUTH ONCE DAILY. 90 capsule 0    pen needle, diabetic 32 gauge x 5/32\" needle 1 each 4 times a day. Use to inject insulin 4 times daily 400 each 1    semaglutide (Ozempic) 2 mg/dose (8 mg/3 mL) pen injector Inject 2 mg under the skin 1 (one) time per week. 3 mL 5    Tresiba FlexTouch U-100 100 unit/mL (3 mL) injection Inject 25 Units under the skin once daily in the morning. 30 mL 1     No current facility-administered medications for this visit.     "

## 2025-06-23 PROBLEM — K76.0 NON-ALCOHOLIC FATTY LIVER DISEASE: Status: ACTIVE | Noted: 2025-06-23

## 2025-06-23 NOTE — ASSESSMENT & PLAN NOTE
Discussed natural history of fatty liver including risk factors and management.  Exercise/Activity  Vigorous physical activity like brisk walking or structured gym exercises 3 times a week for 30 minutes at minimum.    Resistance training to build muscle mass which will aid in weight loss.  Swimming, water aerobics are excellent forms of exercises that are easy on joints.    Exercise for 30 minutes or more at least 3 times a week  Aim to lose 7-10% of your total body weight over 6-12 months  Diet  Avoid/Limit simple carbs including simple sugars  Avoid eating foods that are rich in sugar in excess such as high sugar content fruits (pineapple, real...) and desserts, cakes and pies  Eat more good foods that are rich in good fat such as cold water fish (salmon, swordfish...) as well as avocados and peanut butter in moderation  Snack on nuts that are high in protein and good oils such as walnuts, almonds.   Eat a mediteranean diet which is rich in grilled lean meats, high protein beans and legumes and olive oil.

## 2025-06-23 NOTE — ASSESSMENT & PLAN NOTE
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  However, other etiologies should be ruled out including autoimmune disease, biliary disease and viral hepatitis.  Liver disease work-up will be completed including imaging to assess for fatty liver.

## 2025-06-25 LAB
ALBUMIN SERPL-MCNC: 4 G/DL (ref 3.6–5.1)
ALP BONE CFR SERPL: 37 % (ref 28–66)
ALP INTEST CFR SERPL: 7 % (ref 1–24)
ALP LIVER CFR SERPL: 56 % (ref 25–69)
ALP MACROHEPATIC CFR SERPL: 0 %
ALP PLAC CFR SERPL: 0 %
ALP SERPL-CCNC: 164 U/L (ref 37–153)
ALP SERPL-CCNC: 164 U/L (ref 37–153)
ALT SERPL-CCNC: 40 U/L (ref 6–29)
ANA SER QL IF: NEGATIVE
ANION GAP SERPL CALCULATED.4IONS-SCNC: 10 MMOL/L (CALC) (ref 7–17)
AST SERPL-CCNC: 26 U/L (ref 10–35)
BASOPHILS # BLD AUTO: 38 CELLS/UL (ref 0–200)
BASOPHILS NFR BLD AUTO: 0.6 %
BILIRUB SERPL-MCNC: 0.4 MG/DL (ref 0.2–1.2)
BUN SERPL-MCNC: 18 MG/DL (ref 7–25)
CALCIUM SERPL-MCNC: 9 MG/DL (ref 8.6–10.4)
CHLORIDE SERPL-SCNC: 103 MMOL/L (ref 98–110)
CO2 SERPL-SCNC: 25 MMOL/L (ref 20–32)
CREAT SERPL-MCNC: 0.55 MG/DL (ref 0.5–1.05)
EGFRCR SERPLBLD CKD-EPI 2021: 104 ML/MIN/1.73M2
ENHANCED LIVER FIBROSIS (ELF) SCORE: 9.27
EOSINOPHIL # BLD AUTO: 50 CELLS/UL (ref 15–500)
EOSINOPHIL NFR BLD AUTO: 0.8 %
ERYTHROCYTE [DISTWIDTH] IN BLOOD BY AUTOMATED COUNT: 13 % (ref 11–15)
GLUCOSE SERPL-MCNC: 248 MG/DL (ref 65–139)
HAV AB SER QL IA: REACTIVE
HBV CORE AB SERPL QL IA: NORMAL
HBV SURFACE AB SERPL IA-ACNC: <5 MIU/ML
HBV SURFACE AG SERPL QL IA: NORMAL
HCT VFR BLD AUTO: 42.8 % (ref 35–45)
HCV AB SERPL QL IA: NORMAL
HGB BLD-MCNC: 14.3 G/DL (ref 11.7–15.5)
IGA SERPL-MCNC: 174 MG/DL (ref 70–320)
IGG SERPL-MCNC: 971 MG/DL (ref 600–1540)
IGM SERPL-MCNC: 56 MG/DL (ref 50–300)
LYMPHOCYTES # BLD AUTO: 1884 CELLS/UL (ref 850–3900)
LYMPHOCYTES NFR BLD AUTO: 29.9 %
MCH RBC QN AUTO: 31.8 PG (ref 27–33)
MCHC RBC AUTO-ENTMCNC: 33.4 G/DL (ref 32–36)
MCV RBC AUTO: 95.1 FL (ref 80–100)
MITOCHONDRIA AB SER QL IF: NEGATIVE
MONOCYTES # BLD AUTO: 403 CELLS/UL (ref 200–950)
MONOCYTES NFR BLD AUTO: 6.4 %
NEUTROPHILS # BLD AUTO: 3925 CELLS/UL (ref 1500–7800)
NEUTROPHILS NFR BLD AUTO: 62.3 %
NUCLEAR PORE PROT GP210 AB SER IA-ACNC: <5 U (ref 0–24.9)
PLATELET # BLD AUTO: 230 THOUSAND/UL (ref 140–400)
PMV BLD REES-ECKER: 10 FL (ref 7.5–12.5)
POTASSIUM SERPL-SCNC: 4.2 MMOL/L (ref 3.5–5.3)
PROT SERPL-MCNC: 6.4 G/DL (ref 6.1–8.1)
QUEST FLEXITEST1 RESULTS:: NORMAL
RBC # BLD AUTO: 4.5 MILLION/UL (ref 3.8–5.1)
SMOOTH MUSCLE AB SER QL IF: NEGATIVE
SODIUM SERPL-SCNC: 138 MMOL/L (ref 135–146)
WBC # BLD AUTO: 6.3 THOUSAND/UL (ref 3.8–10.8)

## 2025-06-26 DIAGNOSIS — M54.2 NECK PAIN ON RIGHT SIDE: ICD-10-CM

## 2025-06-26 DIAGNOSIS — M25.511 ACUTE PAIN OF RIGHT SHOULDER: ICD-10-CM

## 2025-06-26 RX ORDER — MELOXICAM 15 MG/1
15 TABLET ORAL DAILY PRN
Qty: 30 TABLET | Refills: 0 | Status: SHIPPED | OUTPATIENT
Start: 2025-06-26 | End: 2025-07-26

## 2025-06-27 LAB — QUEST FLEXITEST1 RESULTS:: NORMAL

## 2025-07-02 ENCOUNTER — HOSPITAL ENCOUNTER (OUTPATIENT)
Dept: RADIOLOGY | Facility: HOSPITAL | Age: 65
Discharge: HOME | End: 2025-07-02
Payer: COMMERCIAL

## 2025-07-02 DIAGNOSIS — R74.8 ELEVATED LIVER ENZYMES: ICD-10-CM

## 2025-07-02 PROCEDURE — 76705 ECHO EXAM OF ABDOMEN: CPT

## 2025-07-02 PROCEDURE — 76705 ECHO EXAM OF ABDOMEN: CPT | Performed by: STUDENT IN AN ORGANIZED HEALTH CARE EDUCATION/TRAINING PROGRAM

## 2025-07-09 ENCOUNTER — CLINICAL SUPPORT (OUTPATIENT)
Dept: GASTROENTEROLOGY | Facility: HOSPITAL | Age: 65
End: 2025-07-09
Payer: COMMERCIAL

## 2025-07-09 DIAGNOSIS — R74.8 ELEVATED LIVER ENZYMES: ICD-10-CM

## 2025-07-09 PROCEDURE — 91200 LIVER ELASTOGRAPHY: CPT | Performed by: STUDENT IN AN ORGANIZED HEALTH CARE EDUCATION/TRAINING PROGRAM

## 2025-07-21 ENCOUNTER — TELEPHONE (OUTPATIENT)
Facility: CLINIC | Age: 65
End: 2025-07-21
Payer: COMMERCIAL

## 2025-07-21 DIAGNOSIS — R39.9 UTI SYMPTOMS: Primary | ICD-10-CM

## 2025-07-21 RX ORDER — CIPROFLOXACIN 500 MG/1
500 TABLET, FILM COATED ORAL 2 TIMES DAILY
Qty: 10 TABLET | Refills: 0 | Status: SHIPPED | OUTPATIENT
Start: 2025-07-21 | End: 2025-07-26

## 2025-07-21 NOTE — TELEPHONE ENCOUNTER
Patient has a UTI for 2 days.  She would like a prescription called in for her.    [facility]/pharmacy #[id number] [address]  Phone: [phone]  Fax: [phone]

## 2025-08-05 ENCOUNTER — APPOINTMENT (OUTPATIENT)
Dept: OPHTHALMOLOGY | Facility: CLINIC | Age: 65
End: 2025-08-05
Payer: COMMERCIAL

## 2025-08-05 ENCOUNTER — APPOINTMENT (OUTPATIENT)
Age: 65
End: 2025-08-05
Payer: COMMERCIAL

## 2025-08-13 ENCOUNTER — APPOINTMENT (OUTPATIENT)
Dept: RHEUMATOLOGY | Facility: CLINIC | Age: 65
End: 2025-08-13
Payer: COMMERCIAL

## 2025-08-19 ENCOUNTER — APPOINTMENT (OUTPATIENT)
Age: 65
End: 2025-08-19
Payer: COMMERCIAL

## 2025-08-19 VITALS
WEIGHT: 139.55 LBS | HEART RATE: 83 BPM | DIASTOLIC BLOOD PRESSURE: 80 MMHG | BODY MASS INDEX: 28.19 KG/M2 | SYSTOLIC BLOOD PRESSURE: 126 MMHG

## 2025-08-19 DIAGNOSIS — Z12.31 ENCOUNTER FOR SCREENING MAMMOGRAM FOR BREAST CANCER: ICD-10-CM

## 2025-08-19 DIAGNOSIS — Z79.4 TYPE 2 DIABETES MELLITUS WITH HYPERGLYCEMIA, WITH LONG-TERM CURRENT USE OF INSULIN: Primary | ICD-10-CM

## 2025-08-19 DIAGNOSIS — E11.65 TYPE 2 DIABETES MELLITUS WITH HYPERGLYCEMIA, WITH LONG-TERM CURRENT USE OF INSULIN: Primary | ICD-10-CM

## 2025-08-19 LAB — POC HEMOGLOBIN A1C: 12.9 % (ref 4.2–6.5)

## 2025-08-19 PROCEDURE — 99214 OFFICE O/P EST MOD 30 MIN: CPT | Performed by: STUDENT IN AN ORGANIZED HEALTH CARE EDUCATION/TRAINING PROGRAM

## 2025-08-19 PROCEDURE — 3074F SYST BP LT 130 MM HG: CPT | Performed by: STUDENT IN AN ORGANIZED HEALTH CARE EDUCATION/TRAINING PROGRAM

## 2025-08-19 PROCEDURE — 3046F HEMOGLOBIN A1C LEVEL >9.0%: CPT | Performed by: STUDENT IN AN ORGANIZED HEALTH CARE EDUCATION/TRAINING PROGRAM

## 2025-08-19 PROCEDURE — 83036 HEMOGLOBIN GLYCOSYLATED A1C: CPT | Performed by: STUDENT IN AN ORGANIZED HEALTH CARE EDUCATION/TRAINING PROGRAM

## 2025-08-19 PROCEDURE — 3079F DIAST BP 80-89 MM HG: CPT | Performed by: STUDENT IN AN ORGANIZED HEALTH CARE EDUCATION/TRAINING PROGRAM

## 2025-08-19 RX ORDER — GLUCAGON 3 MG/1
POWDER NASAL
Qty: 2 EACH | Refills: 1 | Status: SHIPPED | OUTPATIENT
Start: 2025-08-19

## 2025-08-19 RX ORDER — BLOOD-GLUCOSE SENSOR
EACH MISCELLANEOUS
Qty: 6 EACH | Refills: 3 | Status: SHIPPED | OUTPATIENT
Start: 2025-08-19

## 2025-08-19 ASSESSMENT — PATIENT HEALTH QUESTIONNAIRE - PHQ9
1. LITTLE INTEREST OR PLEASURE IN DOING THINGS: NOT AT ALL
SUM OF ALL RESPONSES TO PHQ9 QUESTIONS 1 AND 2: 0
2. FEELING DOWN, DEPRESSED OR HOPELESS: NOT AT ALL

## 2025-08-19 ASSESSMENT — ENCOUNTER SYMPTOMS
DEPRESSION: 0
LOSS OF SENSATION IN FEET: 1
OCCASIONAL FEELINGS OF UNSTEADINESS: 0

## 2025-08-21 ENCOUNTER — TELEPHONE (OUTPATIENT)
Dept: CARE COORDINATION | Age: 65
End: 2025-08-21
Payer: COMMERCIAL

## 2025-08-25 ENCOUNTER — TELEPHONE (OUTPATIENT)
Dept: CARE COORDINATION | Age: 65
End: 2025-08-25
Payer: COMMERCIAL

## 2025-08-26 DIAGNOSIS — R74.8 ELEVATED LIVER ENZYMES: Primary | ICD-10-CM

## 2025-08-27 ENCOUNTER — TELEPHONE (OUTPATIENT)
Dept: CARE COORDINATION | Age: 65
End: 2025-08-27
Payer: COMMERCIAL

## 2025-09-04 ENCOUNTER — TELEPHONE (OUTPATIENT)
Facility: CLINIC | Age: 65
End: 2025-09-04
Payer: COMMERCIAL

## 2025-09-05 ENCOUNTER — APPOINTMENT (OUTPATIENT)
Dept: ENDOCRINOLOGY | Facility: CLINIC | Age: 65
End: 2025-09-05
Payer: COMMERCIAL

## 2025-09-05 ENCOUNTER — APPOINTMENT (OUTPATIENT)
Dept: GASTROENTEROLOGY | Facility: CLINIC | Age: 65
End: 2025-09-05
Payer: COMMERCIAL

## 2025-09-12 ENCOUNTER — APPOINTMENT (OUTPATIENT)
Dept: OPHTHALMOLOGY | Facility: CLINIC | Age: 65
End: 2025-09-12
Payer: COMMERCIAL

## 2025-10-21 ENCOUNTER — APPOINTMENT (OUTPATIENT)
Dept: ENDOCRINOLOGY | Facility: CLINIC | Age: 65
End: 2025-10-21
Payer: COMMERCIAL

## 2025-12-29 ENCOUNTER — APPOINTMENT (OUTPATIENT)
Dept: ENDOCRINOLOGY | Facility: CLINIC | Age: 65
End: 2025-12-29
Payer: COMMERCIAL

## 2026-02-05 ENCOUNTER — APPOINTMENT (OUTPATIENT)
Age: 66
End: 2026-02-05
Payer: COMMERCIAL

## (undated) DEVICE — SPEAR, EYE, SURGICAL, WECK-CEL, CELLULOSE

## (undated) DEVICE — COVER HANDLE LIGHT, STERIS, BLUE, STERILE

## (undated) DEVICE — SYRINGE, 1 CC, LUER LOCK

## (undated) DEVICE — KIT, CONSTELLATION, 25G VITRECTOMY

## (undated) DEVICE — TOWEL, SURGICAL, NEURO, O/R, 16 X 26, BLUE, STERILE

## (undated) DEVICE — NEEDLE, HYPODERMIC, REGULAR WALL, REGULAR BEVEL, 30 G X 0.5 IN

## (undated) DEVICE — NEEDLE, RETROBULBAR, 23G X 8MM

## (undated) DEVICE — MEMBRANE SCRAPER, CURVED, 25G PLUS, FLEX LOOP

## (undated) DEVICE — Device

## (undated) DEVICE — 25+GA HYPERVIT BEVEL 20K CPM WIDE TOTAL PLUS VITRECTOMY PAK

## (undated) DEVICE — GLOVE, SURGICAL, PROTEXIS PI , 7.5, PF, LF

## (undated) DEVICE — 25GA MAXGRIP FORCEPS, DISPOSABLE

## (undated) DEVICE — BIOM, OPTIC, HD PROFESSIONAL F/F=200MM

## (undated) DEVICE — NEEDLE, HYPODERMIC, REGULAR WALL, REGULAR BEVEL, 18 G X 1.5 IN

## (undated) DEVICE — DRAPE, SURGICAL, STERI DRAPE, INCISE, W/POUCH, MED, LF

## (undated) DEVICE — SYRINGE, 10 CC, LUER LOCK

## (undated) DEVICE — FORCEPS, 25G PLUS, MAXGRIP REVOLUTION

## (undated) DEVICE — PROBE, 25G ILLUMINATED FLEX CURVED LASER 2X W/RFID

## (undated) DEVICE — LABELS, OR GENERAL, W/MARKER

## (undated) DEVICE — PROBE, DIATHERMY 25G